# Patient Record
Sex: MALE | Race: WHITE | NOT HISPANIC OR LATINO
[De-identification: names, ages, dates, MRNs, and addresses within clinical notes are randomized per-mention and may not be internally consistent; named-entity substitution may affect disease eponyms.]

---

## 2017-06-16 ENCOUNTER — APPOINTMENT (OUTPATIENT)
Dept: CT IMAGING | Facility: IMAGING CENTER | Age: 72
End: 2017-06-16

## 2017-06-16 ENCOUNTER — OUTPATIENT (OUTPATIENT)
Dept: OUTPATIENT SERVICES | Facility: HOSPITAL | Age: 72
LOS: 1 days | End: 2017-06-16
Payer: MEDICARE

## 2017-06-16 DIAGNOSIS — C34.90 MALIGNANT NEOPLASM OF UNSPECIFIED PART OF UNSPECIFIED BRONCHUS OR LUNG: ICD-10-CM

## 2017-06-16 PROCEDURE — 71250 CT THORAX DX C-: CPT

## 2017-06-20 ENCOUNTER — APPOINTMENT (OUTPATIENT)
Dept: THORACIC SURGERY | Facility: CLINIC | Age: 72
End: 2017-06-20

## 2017-06-20 VITALS
HEART RATE: 78 BPM | DIASTOLIC BLOOD PRESSURE: 81 MMHG | HEIGHT: 67 IN | WEIGHT: 158 LBS | SYSTOLIC BLOOD PRESSURE: 143 MMHG | BODY MASS INDEX: 24.8 KG/M2 | OXYGEN SATURATION: 98 %

## 2017-10-10 ENCOUNTER — APPOINTMENT (OUTPATIENT)
Dept: CT IMAGING | Facility: IMAGING CENTER | Age: 72
End: 2017-10-10
Payer: MEDICARE

## 2017-10-10 ENCOUNTER — OUTPATIENT (OUTPATIENT)
Dept: OUTPATIENT SERVICES | Facility: HOSPITAL | Age: 72
LOS: 1 days | End: 2017-10-10
Payer: MEDICARE

## 2017-10-10 DIAGNOSIS — C34.90 MALIGNANT NEOPLASM OF UNSPECIFIED PART OF UNSPECIFIED BRONCHUS OR LUNG: ICD-10-CM

## 2017-10-10 PROCEDURE — 71250 CT THORAX DX C-: CPT | Mod: 26

## 2017-10-10 PROCEDURE — 71250 CT THORAX DX C-: CPT

## 2017-12-19 ENCOUNTER — APPOINTMENT (OUTPATIENT)
Dept: THORACIC SURGERY | Facility: CLINIC | Age: 72
End: 2017-12-19
Payer: MEDICARE

## 2017-12-19 VITALS
OXYGEN SATURATION: 98 % | HEART RATE: 69 BPM | HEIGHT: 67 IN | DIASTOLIC BLOOD PRESSURE: 75 MMHG | WEIGHT: 158 LBS | RESPIRATION RATE: 14 BRPM | SYSTOLIC BLOOD PRESSURE: 150 MMHG | BODY MASS INDEX: 24.8 KG/M2

## 2017-12-19 PROCEDURE — 99215 OFFICE O/P EST HI 40 MIN: CPT

## 2018-05-22 ENCOUNTER — APPOINTMENT (OUTPATIENT)
Dept: THORACIC SURGERY | Facility: CLINIC | Age: 73
End: 2018-05-22
Payer: MEDICARE

## 2018-05-22 VITALS
HEART RATE: 80 BPM | SYSTOLIC BLOOD PRESSURE: 147 MMHG | BODY MASS INDEX: 24.33 KG/M2 | WEIGHT: 155 LBS | OXYGEN SATURATION: 98 % | HEIGHT: 67 IN | RESPIRATION RATE: 18 BRPM | DIASTOLIC BLOOD PRESSURE: 74 MMHG

## 2018-05-22 DIAGNOSIS — C34.90 MALIGNANT NEOPLASM OF UNSPECIFIED PART OF UNSPECIFIED BRONCHUS OR LUNG: ICD-10-CM

## 2018-05-22 PROCEDURE — 99213 OFFICE O/P EST LOW 20 MIN: CPT

## 2018-11-27 ENCOUNTER — APPOINTMENT (OUTPATIENT)
Dept: THORACIC SURGERY | Facility: CLINIC | Age: 73
End: 2018-11-27
Payer: MEDICARE

## 2018-11-27 VITALS
TEMPERATURE: 98 F | HEART RATE: 73 BPM | OXYGEN SATURATION: 96 % | BODY MASS INDEX: 25.39 KG/M2 | WEIGHT: 158 LBS | HEIGHT: 66 IN | RESPIRATION RATE: 18 BRPM | DIASTOLIC BLOOD PRESSURE: 90 MMHG | SYSTOLIC BLOOD PRESSURE: 148 MMHG

## 2018-11-27 PROCEDURE — 99213 OFFICE O/P EST LOW 20 MIN: CPT

## 2019-05-28 ENCOUNTER — APPOINTMENT (OUTPATIENT)
Dept: THORACIC SURGERY | Facility: CLINIC | Age: 74
End: 2019-05-28
Payer: MEDICARE

## 2019-05-28 VITALS
WEIGHT: 158 LBS | HEIGHT: 66 IN | RESPIRATION RATE: 17 BRPM | OXYGEN SATURATION: 98 % | BODY MASS INDEX: 25.39 KG/M2 | HEART RATE: 64 BPM | DIASTOLIC BLOOD PRESSURE: 78 MMHG | SYSTOLIC BLOOD PRESSURE: 150 MMHG | TEMPERATURE: 97.8 F

## 2019-05-28 PROCEDURE — 99214 OFFICE O/P EST MOD 30 MIN: CPT

## 2019-05-29 NOTE — PHYSICAL EXAM
[Sclera] : the sclera and conjunctiva were normal [Neck Appearance] : the appearance of the neck was normal [Neck Cervical Mass (___cm)] : no neck mass was observed [] : the neck was supple [Auscultation Breath Sounds / Voice Sounds] : lungs were clear to auscultation bilaterally [Respiration, Rhythm And Depth] : normal respiratory rhythm and effort [Heart Sounds] : normal S1 and S2 [Heart Rate And Rhythm] : heart rate was normal and rhythm regular [Abdomen Soft] : soft [Examination Of The Chest] : the chest was normal in appearance [Abdomen Tenderness] : non-tender [Cervical Lymph Nodes Enlarged Posterior Bilaterally] : posterior cervical [Cervical Lymph Nodes Enlarged Anterior Bilaterally] : anterior cervical [Supraclavicular Lymph Nodes Enlarged Bilaterally] : supraclavicular [No CVA Tenderness] : no ~M costovertebral angle tenderness [Abnormal Walk] : normal gait [Skin Color & Pigmentation] : normal skin color and pigmentation [Oriented To Time, Place, And Person] : oriented to person, place, and time [No Focal Deficits] : no focal deficits [Impaired Insight] : insight and judgment were intact [Affect] : the affect was normal

## 2019-05-31 NOTE — ASSESSMENT
[FreeTextEntry1] : 72 y/o male returns for follow up S/P Right VATS, RULobectomy on 4/25/12 for T3 N0 moderately differentiated adenocarcinoma for two separate tumors that measured 2.0 x 1.5 cm and 0.9 x 0.6 cm. 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 The April tumor was compared to the May tumor, and one of those tumors is histomorphologically identical. Thus M1a (separate tumor nodule in the contralateral lobe) was designated. No mutation identified and not treated with adjuvant chemotherapy. \par \par Chest CT Scan 5/17/19 revealed:\par - a nonspecific groundglass density noted in the midportion the right lung, series 4 image 36, measures 6 mm in greatest dimension, unchanged\par - postsurgical reaction noted in the right paramediastinal contour in the region of the right tracheobronchial tree and in the left subhilar region\par - no interval change since prior CT Scan\par - no evidence of recurrent neoplasm\par \par I have reviewed the patient's medical records and diagnostic images during the time of this office visit, and I have made the following recommendation: \par 1.  Follow up in 6 months with CXR.\par \par Written by Debora Babb NP, acting as a scribe for AWA VALENTE MD.\par \par The documentation recorded by the scribe accurately reflects the service I personally performed and the decisions made by me. AWA VALENTE MD\par

## 2019-05-31 NOTE — CONSULT LETTER
[Dear  ___] : Dear  [unfilled], [Courtesy Letter:] : I had the pleasure of seeing your patient, [unfilled], in my office today. [Please see my note below.] : Please see my note below. [Sincerely,] : Sincerely, [FreeTextEntry2] : Dr. Radha Lam,  [FreeTextEntry3] : \par \par \par Ac Ramos MD, FACS \par Chief, Division of Thoracic Surgery \par Director, Minimally Invasive Thoracic Surgery \par Department of Cardiovascular and Thoracic Surgery \par [facility] \par , Cardiovascular and Thoracic Surgery

## 2019-05-31 NOTE — HISTORY OF PRESENT ILLNESS
[FreeTextEntry1] : 72 y/o male returns for follow up S/P Right VATS, RULobectomy on 4/25/12 for T3 N0 moderately differentiated adenocarcinoma for two separate tumors that measured 2.0 x 1.5 cm and 0.9 x 0.6 cm. 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 The April tumor was compared to the May tumor, and one of those tumors is histomorphologically identical. Thus M1a (separate tumor nodule in the contralateral lobe) was designated. No mutation identified and not treated with adjuvant chemotherapy. \par \par Chest CT Scan 5/17/19 revealed:\par - a nonspecific groundglass density noted in the midportion the right lung, series 4 image 36, measures 6 mm in greatest dimension, unchanged\par - postsurgical reaction noted in the right paramediastinal contour in the region of the right tracheobronchial tree and in the left subhilar region\par - no interval change since prior CT Scan\par - no evidence of recurrent neoplasm\par \par He reports that he feels well and he denies any fatigue, fever, chills, cough, shortness of breath, chest pain, hemoptysis, or recent illness.

## 2019-11-26 ENCOUNTER — APPOINTMENT (OUTPATIENT)
Dept: THORACIC SURGERY | Facility: CLINIC | Age: 74
End: 2019-11-26

## 2020-01-07 ENCOUNTER — APPOINTMENT (OUTPATIENT)
Dept: THORACIC SURGERY | Facility: CLINIC | Age: 75
End: 2020-01-07
Payer: MEDICARE

## 2020-01-07 VITALS
SYSTOLIC BLOOD PRESSURE: 151 MMHG | WEIGHT: 158 LBS | DIASTOLIC BLOOD PRESSURE: 83 MMHG | TEMPERATURE: 98.1 F | RESPIRATION RATE: 16 BRPM | BODY MASS INDEX: 25.39 KG/M2 | HEART RATE: 82 BPM | HEIGHT: 66 IN | OXYGEN SATURATION: 95 %

## 2020-01-07 PROCEDURE — 99214 OFFICE O/P EST MOD 30 MIN: CPT

## 2020-01-07 NOTE — PHYSICAL EXAM
[Sclera] : the sclera and conjunctiva were normal [Neck Appearance] : the appearance of the neck was normal [] : the neck was supple [Neck Cervical Mass (___cm)] : no neck mass was observed [Respiration, Rhythm And Depth] : normal respiratory rhythm and effort [Apical Impulse] : the apical impulse was normal [Auscultation Breath Sounds / Voice Sounds] : lungs were clear to auscultation bilaterally [Examination Of The Chest] : the chest was normal in appearance [Abdomen Soft] : soft [Abdomen Tenderness] : non-tender [Cervical Lymph Nodes Enlarged Posterior Bilaterally] : posterior cervical [Cervical Lymph Nodes Enlarged Anterior Bilaterally] : anterior cervical [Supraclavicular Lymph Nodes Enlarged Bilaterally] : supraclavicular [No CVA Tenderness] : no ~M costovertebral angle tenderness [No Focal Deficits] : no focal deficits [Skin Color & Pigmentation] : normal skin color and pigmentation [Abnormal Walk] : normal gait [Impaired Insight] : insight and judgment were intact [Oriented To Time, Place, And Person] : oriented to person, place, and time [Affect] : the affect was normal

## 2020-01-13 NOTE — HISTORY OF PRESENT ILLNESS
[FreeTextEntry1] : 73 y/o male returns for follow up S/P Right VATS, RULobectomy on 4/25/12 for T3 N0 moderately differentiated adenocarcinoma for two separate tumors that measured 2.0 x 1.5 cm and 0.9 x 0.6 cm. 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 The April tumor was compared to the May tumor, and one of those tumors is histomorphologically identical. Thus M1a (separate tumor nodule in the contralateral lobe) was designated. No mutation identified.  He saw oncology for consultation and no adjuvant treatment was advise.  \par \par CXR 11/18/19 No evidence of neoplasm. Lungs are free from infiltrate or effusions. \par \par Chest CT Scan 5/17/19 revealed:\par - a nonspecific groundglass density noted in the midportion the right lung, series 4 image 36, measures 6 mm in greatest dimension, unchanged\par - postsurgical reaction noted in the right paramediastinal contour in the region of the right tracheobronchial tree and in the left subhilar region\par - no interval change since prior CT Scan\par - no evidence of recurrent neoplasm\par \par He returns today and reports that he feels well.  He denies any fever, chills, cough, shortness of breath, chest pain, hemoptysis, or recent illness. \par

## 2020-01-13 NOTE — ASSESSMENT
[FreeTextEntry1] : 75 y/o male returns for follow up S/P Right VATS, RULobectomy on 4/25/12 for T3 N0 moderately differentiated adenocarcinoma for two separate tumors that measured 2.0 x 1.5 cm and 0.9 x 0.6 cm. 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 The April tumor was compared to the May tumor, and one of those tumors is histomorphologically identical. Thus M1a (separate tumor nodule in the contralateral lobe) was designated. No mutation identified.  He saw oncology for consultation and no adjuvant treatment was advise.  \par \par CXR 11/18/19 No evidence of neoplasm. Lungs are free from infiltrate or effusions. \par \par Chest CT Scan 5/17/19 revealed:\par - a nonspecific groundglass density noted in the midportion the right lung, series 4 image 36, measures 6 mm in greatest dimension, unchanged\par - postsurgical reaction noted in the right paramediastinal contour in the region of the right tracheobronchial tree and in the left subhilar region\par - no interval change since prior CT Scan\par - no evidence of recurrent neoplasm\par \par I have reviewed the patient's medical records and diagnostic images during the time of this office visit, and I have made the following recommendation:\par 1.  Follow up in 6 months with noncontrast Chest CT.\par \par I personally performed the services described in the documentation, reviewed the documentation recorded by the scribe in my presence and it accurately and completely records my words and actions.\par \par I, Debora Babb NP, am scribing for and the presence of Dr. Ramos, the following sections HISTORY OF PRESENT ILLNESS, PAST MEDICAL/FAMILY/SOCIAL HISTORY; REVIEW OF SYSTEMS; VITAL SIGNS; PHYSICAL EXAM; DISPOSITION.\par

## 2020-01-13 NOTE — CONSULT LETTER
[Dear  ___] : Dear  [unfilled], [Courtesy Letter:] : I had the pleasure of seeing your patient, [unfilled], in my office today. [Please see my note below.] : Please see my note below. [Sincerely,] : Sincerely, [FreeTextEntry2] : Radha Lam MD (Onc) \par  [FreeTextEntry3] : Ac Ramos MD, FACS \par Chief, Division of Thoracic Surgery \par Director, Minimally Invasive Thoracic Surgery \par Department of Cardiovascular and Thoracic Surgery \par [facility] \par , Cardiovascular and Thoracic Surgery \par [facility] [employer] [facility] \par

## 2020-06-23 ENCOUNTER — APPOINTMENT (OUTPATIENT)
Dept: THORACIC SURGERY | Facility: CLINIC | Age: 75
End: 2020-06-23

## 2020-08-11 ENCOUNTER — APPOINTMENT (OUTPATIENT)
Dept: THORACIC SURGERY | Facility: CLINIC | Age: 75
End: 2020-08-11
Payer: MEDICARE

## 2020-08-11 VITALS
TEMPERATURE: 97.9 F | SYSTOLIC BLOOD PRESSURE: 164 MMHG | DIASTOLIC BLOOD PRESSURE: 84 MMHG | WEIGHT: 160 LBS | BODY MASS INDEX: 25.71 KG/M2 | OXYGEN SATURATION: 99 % | RESPIRATION RATE: 18 BRPM | HEIGHT: 66 IN | HEART RATE: 71 BPM

## 2020-08-11 PROCEDURE — 99214 OFFICE O/P EST MOD 30 MIN: CPT

## 2020-08-11 NOTE — CONSULT LETTER
[Courtesy Letter:] : I had the pleasure of seeing your patient, [unfilled], in my office today. [Please see my note below.] : Please see my note below. [Consult Closing:] : Thank you very much for allowing me to participate in the care of this patient.  If you have any questions, please do not hesitate to contact me. [Sincerely,] : Sincerely, [FreeTextEntry2] : Radha Lam MD (Onc)  [FreeTextEntry3] : \par \par \par Ac Ramos MD, FACS \par Chief, Division of Thoracic Surgery \par Director, Minimally Invasive Thoracic Surgery \par Department of Cardiovascular and Thoracic Surgery \par [facility] \par , Cardiovascular and Thoracic Surgery

## 2020-08-11 NOTE — PHYSICAL EXAM
[Neck Appearance] : the appearance of the neck was normal [Sclera] : the sclera and conjunctiva were normal [PERRL With Normal Accommodation] : pupils were equal in size, round, and reactive to light [Auscultation Breath Sounds / Voice Sounds] : lungs were clear to auscultation bilaterally [] : no respiratory distress [Respiration, Rhythm And Depth] : normal respiratory rhythm and effort [Murmurs] : no murmurs [Heart Sounds] : normal S1 and S2 [Abdomen Soft] : soft [Abdomen Tenderness] : non-tender [Examination Of The Chest] : the chest was normal in appearance [Cervical Lymph Nodes Enlarged Anterior Bilaterally] : anterior cervical [Supraclavicular Lymph Nodes Enlarged Bilaterally] : supraclavicular [Abnormal Walk] : normal gait [Cervical Lymph Nodes Enlarged Posterior Bilaterally] : posterior cervical [Skin Turgor] : normal skin turgor [No Focal Deficits] : no focal deficits [Skin Color & Pigmentation] : normal skin color and pigmentation [Affect] : the affect was normal [Oriented To Time, Place, And Person] : oriented to person, place, and time [Mood] : the mood was normal

## 2020-08-11 NOTE — HISTORY OF PRESENT ILLNESS
[FreeTextEntry1] : Mr. Benson is a 75 y/o male who presents today for follow up.\par \par He is s/p Right VATS, RULobectomy on 4/25/12 for T3 N0 moderately differentiated adenocarcinoma for two separate tumors that measured 2.0 x 1.5 cm and 0.9 x 0.6 cm.  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  The April tumor was compared to the May tumor, and one of those tumors is histomorphologically identical. Thus M1a (separate tumor nodule in the contralateral lobe) was designated. No mutation identified. He saw oncology for consultation and no adjuvant treatment was advised. \par \par CT Chest on 5/17/19 revealed:\par - Nonspecific groundglass density noted in the midportion the right lung, 6 mm, unchanged\par - Postsurgical reaction noted in the right paramediastinal contour in the region of the right tracheobronchial tree and in the left subhilar region\par \par CXR on 11/18/19 revealed:\par - No evidence of neoplasm, lungs are free from infiltrate or effusions. \par \par CT Chest on 7/16/2020 reveals:\par - SHORTY subpleural gg nodule appears increased compared to 2018 + 2019, now measuring 17 x 18 x 2 0mm (previously 17 x 7 x 9 mm) \par - Right upper lung 6 mm gg attenuation nodule appears similar. \par - Ill-defined left apical gg attenuation nodule measuring 10 mm overall similar. \par - Right midlung field 6 mm nodule characterized by central lucency and slightly dense border not appreciably changed.\par - Right mid lower lung field gg attenuation nodule measuring 14 mm which appears to be increasing in size. 2017 was 9mm; 2018 9mm; 2019 12mm\par \par He had PET/CT ordered by oncology, Dr. Lam.\par \par PET/CT On 7/30/2020 reveals:\par - No evidence of increased FDG uptake in either lung to indicate neoplastic etiology\par - Groundglass density described in recent CT Chest at peripheral aspect of left lung is not FDG avid\par - Additional described densities in right + left lung do not accumulate isotope\par \par He denies any fever, chills, cough, shortness of breath, chest pain, hemoptysis, or recent illness.\par

## 2020-08-11 NOTE — ASSESSMENT
[FreeTextEntry1] : 75 y/o male, follow up.  He is s/p Right VATS, RULobectomy on 4/25/12 for T3 N0 moderately differentiated adenocarcinoma for two separate tumors that measured 2.0 x 1.5 cm and 0.9 x 0.6 cm.  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  The April tumor was compared to the May tumor, and one of those tumors is histomorphologically identical. Thus M1a (separate tumor nodule in the contralateral lobe) was designated. No mutation identified. He saw oncology for consultation and no adjuvant treatment was advised. \par \par CT Chest on 7/16/2020 reveals:\par - SHORTY subpleural gg nodule appears increased compared to 2018 + 2019, now measuring 17 x 18 x 2 0mm (previously 17 x 7 x 9 mm) \par - Right upper lung 6 mm gg attenuation nodule appears similar. \par - Ill-defined left apical gg attenuation nodule measuring 10 mm overall similar. \par - Right midlung field 6 mm nodule characterized by central lucency and slightly dense border not appreciably changed.\par - Right mid lower lung field gg attenuation nodule measuring 14 mm which appears to be increasing in size. 2017 was 9mm; 2018 9mm; 2019 12mm\par \par He had PET/CT ordered by oncology, Dr. Lam.\par \par PET/CT On 7/30/2020 reveals:\par - No evidence of increased FDG uptake in either lung to indicate neoplastic etiology\par - Groundglass density described in recent CT Chest at peripheral aspect of left lung is not FDG avid\par - Additional described densities in right + left lung do not accumulate isotope\par \par I have reviewed the patient's medical records and diagnostic images during the time of this office visit, and I have made the following recommendation:\par 1.  Return to office for follow up with CT Chest in 6 months\par \par \par I personally performed the services described in the documentation, reviewed the documentation recorded by the scribe in my presence and it accurately and completely records my words and actions.\par \par I, Julianna Martin, am scribing for and the presence of ALF Hidalgo the following sections HISTORY OF PRESENT ILLNESSES, PAST MEDICAL/FAMILY/SOCIAL HISTORY; REVIEW OF SYSTEMS; VITAL SIGNS; PHYSICAL EXAM; DISPOSITION.\par

## 2021-03-09 ENCOUNTER — APPOINTMENT (OUTPATIENT)
Dept: THORACIC SURGERY | Facility: CLINIC | Age: 76
End: 2021-03-09
Payer: MEDICARE

## 2021-03-09 VITALS
SYSTOLIC BLOOD PRESSURE: 167 MMHG | DIASTOLIC BLOOD PRESSURE: 95 MMHG | RESPIRATION RATE: 18 BRPM | OXYGEN SATURATION: 98 % | WEIGHT: 156 LBS | HEIGHT: 66 IN | TEMPERATURE: 98 F | BODY MASS INDEX: 25.07 KG/M2 | HEART RATE: 80 BPM

## 2021-03-09 PROCEDURE — 99072 ADDL SUPL MATRL&STAF TM PHE: CPT

## 2021-03-09 PROCEDURE — 99214 OFFICE O/P EST MOD 30 MIN: CPT

## 2021-03-09 NOTE — HISTORY OF PRESENT ILLNESS
[FreeTextEntry1] : Mr. Benson is a 76 y/o male who presents today for follow up.\par \par He is s/p Right VATS, RULobectomy on 4/25/12 for T3 N0 moderately differentiated adenocarcinoma for two separate tumors that measured 2.0 x 1.5 cm and 0.9 x 0.6 cm. 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 The April tumor was compared to the May tumor, and one of those tumors is histomorphologically identical. Thus M1a (separate tumor nodule in the contralateral lobe) was designated. No mutation identified. He saw oncology for consultation and no adjuvant treatment was advised. \par \par CT Chest on 5/17/19 revealed:\par - Nonspecific groundglass density noted in the midportion the right lung, 6 mm, unchanged\par - Postsurgical reaction noted in the right paramediastinal contour in the region of the right tracheobronchial tree and in the left subhilar region\par \par CXR on 11/18/19 revealed:\par - No evidence of neoplasm, lungs are free from infiltrate or effusions. \par \par CT Chest on 7/16/2020 reveals:\par - SHORTY subpleural gg nodule appears increased compared to 2018 + 2019, now measuring 17 x 18 x 2 0mm (previously 17 x 7 x 9 mm) \par - Right upper lung 6 mm gg attenuation nodule appears similar. \par - Ill-defined left apical gg attenuation nodule measuring 10 mm overall similar. \par - Right midlung field 6 mm nodule characterized by central lucency and slightly dense border not appreciably changed.\par - Right mid lower lung field gg attenuation nodule measuring 14 mm which appears to be increasing in size. 2017 was 9mm; 2018 9mm; 2019 12mm\par \par PET/CT On 7/30/2020 reveals:\par - No evidence of increased FDG uptake in either lung to indicate neoplastic etiology\par - Groundglass density described in recent CT Chest at peripheral aspect of left lung is not FDG avid\par - Additional described densities in right + left lung do not accumulate isotope\par \par CT chest on 11/19/2020:\par - COPD\par - pleural parenchymal scarring at the left lung apex is unchanged\par - a subpleural groundglass density at the peripheral aspect of the left mid lung is unchanged in size and appearance, again measuring 2.2 cm in craniocaudal dimension by 8.5 mm un width. No new pulmonary nodules are noted. \par - prominence of the ascending aorta to 3.9 cm.\par \par Patient is here today for a follow up. Patient denies shortness of breath, cough, chest pain, fever, chills.

## 2021-03-09 NOTE — ASSESSMENT
[FreeTextEntry1] : Mr. Benson is a 76 y/o male who presents today for follow up.\par \par He is s/p Right VATS, RULobectomy on 4/25/12 for T3 N0 moderately differentiated adenocarcinoma for two separate tumors that measured 2.0 x 1.5 cm and 0.9 x 0.6 cm. 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 The April tumor was compared to the May tumor, and one of those tumors is histomorphologically identical. Thus M1a (separate tumor nodule in the contralateral lobe) was designated. No mutation identified. He saw oncology for consultation and no adjuvant treatment was advised. \par \par CT chest on 11/19/2020:\par - COPD\par - pleural parenchymal scarring at the left lung apex is unchanged\par - a subpleural groundglass density at the peripheral aspect of the left mid lung is unchanged in size and appearance, again measuring 2.2 cm in craniocaudal dimension by 8.5 mm un width. No new pulmonary nodules are noted. \par - prominence of the ascending aorta to 3.9 cm.\par \par I have reviewed the patient's medical records and diagnostic images at time of this office consultation and have made the following recommendation:\par 1. CT chest reviewed and explained to patient, I recommended patient to return to office in May, 2021 with CT Chest without contrast.\par 2. F/u with Dr. Lam\par \par \par I personally performed the services described in the documentation, reviewed the documentation recorded by the scribe in my presence and it accurately and completely records my words and actions.\par \par I, Karli Cheek, NP, am scribing for and the presence of ALF Hidalgo, the following sections HISTORY OF PRESENT ILLNESS, PAST MEDICAL/FAMILY/SOCIAL HISTORY; REVIEW OF SYSTEMS; VITAL SIGNS; PHYSICAL EXAM; DISPOSITION. \par

## 2021-03-09 NOTE — DATA REVIEWED
[FreeTextEntry1] : CT chest on 11/19/2020:\par - COPD\par - pleural parenchymal scarring at the left lung apex is unchanged\par - a subpleural groundglass density at the peripheral aspect of the left mid lung is unchanged in size and appearance, again measuring 2.2 cm in craniocaudal dimension by 8.5 mm un width. No new pulmonary nodules are noted. \par - prominence of the ascending aorta to 3.9 cm.

## 2021-03-09 NOTE — CONSULT LETTER
[Dear  ___] : Dear  [unfilled], [Consult Letter:] : I had the pleasure of evaluating your patient, [unfilled]. [( Thank you for referring [unfilled] for consultation for _____ )] : Thank you for referring [unfilled] for consultation for [unfilled] [Please see my note below.] : Please see my note below. [Consult Closing:] : Thank you very much for allowing me to participate in the care of this patient.  If you have any questions, please do not hesitate to contact me. [Sincerely,] : Sincerely, [FreeTextEntry2] : Radha Lam MD (Onc)  [FreeTextEntry3] : Ac Ramos MD, FACS \par Chief, Division of Thoracic Surgery \par Director, Minimally Invasive Thoracic Surgery \par Department of Cardiovascular and Thoracic Surgery \par [facility] \par , Cardiovascular and Thoracic Surgery\par \par

## 2021-05-04 ENCOUNTER — APPOINTMENT (OUTPATIENT)
Dept: THORACIC SURGERY | Facility: CLINIC | Age: 76
End: 2021-05-04
Payer: MEDICARE

## 2021-05-04 ENCOUNTER — NON-APPOINTMENT (OUTPATIENT)
Age: 76
End: 2021-05-04

## 2021-05-04 VITALS
SYSTOLIC BLOOD PRESSURE: 166 MMHG | WEIGHT: 159 LBS | TEMPERATURE: 98.2 F | DIASTOLIC BLOOD PRESSURE: 84 MMHG | HEART RATE: 73 BPM | BODY MASS INDEX: 25.55 KG/M2 | OXYGEN SATURATION: 99 % | HEIGHT: 66 IN

## 2021-05-04 DIAGNOSIS — Z01.818 ENCOUNTER FOR OTHER PREPROCEDURAL EXAMINATION: ICD-10-CM

## 2021-05-04 PROCEDURE — 99072 ADDL SUPL MATRL&STAF TM PHE: CPT

## 2021-05-04 PROCEDURE — 99214 OFFICE O/P EST MOD 30 MIN: CPT

## 2021-05-04 NOTE — CONSULT LETTER
[Dear  ___] : Dear  [unfilled], [Courtesy Letter:] : I had the pleasure of seeing your patient, [unfilled], in my office today. [Please see my note below.] : Please see my note below. [Sincerely,] : Sincerely, [FreeTextEntry2] : Radha Lam MD (Onc)  [FreeTextEntry3] : Ac Ramos MD, FACS \par Chief, Division of Thoracic Surgery \par Director, Minimally Invasive Thoracic Surgery \par Department of Cardiovascular and Thoracic Surgery \par [facility] \par , Cardiovascular and Thoracic Surgery\par \par \par

## 2021-05-04 NOTE — HISTORY OF PRESENT ILLNESS
[FreeTextEntry1] : Mr. Benson is a 74 y/o male who presents today for follow up.\par \par He is s/p Right VATS, RULobectomy on 4/25/12 for T3 N0 moderately differentiated adenocarcinoma for two separate tumors that measured 2.0 x 1.5 cm and 0.9 x 0.6 cm. 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 The April tumor was compared to the May tumor, and one of those tumors is histomorphologically identical. Thus M1a (separate tumor nodule in the contralateral lobe) was designated. No mutation identified. He saw oncology for consultation and no adjuvant treatment was advised. \par \par CT Chest on 7/16/2020 reveals:\par - SHORTY subpleural gg nodule appears increased compared to 2018 + 2019, now measuring 17 x 18 x 2 0mm (previously 17 x 7 x 9 mm) \par - Right upper lung 6 mm gg attenuation nodule appears similar. \par - Ill-defined left apical gg attenuation nodule measuring 10 mm overall similar. \par - Right midlung field 6 mm nodule characterized by central lucency and slightly dense border not appreciably changed.\par - Right mid lower lung field gg attenuation nodule measuring 14 mm which appears to be increasing in size. 2017 was 9mm; 2018 9mm; 2019 12mm\par \par PET/CT On 7/30/2020 reveals:\par - No evidence of increased FDG uptake in either lung to indicate neoplastic etiology\par - Groundglass density described in recent CT Chest at peripheral aspect of left lung is not FDG avid\par - Additional described densities in right + left lung do not accumulate isotope\par \par CT chest on 11/19/2020:\par - COPD\par - pleural parenchymal scarring at the left lung apex is unchanged\par - a subpleural groundglass density at the peripheral aspect of the left mid lung is unchanged in size and appearance, again measuring 2.2 cm in craniocaudal dimension by 8.5 mm un width. No new pulmonary nodules are noted. \par - prominence of the ascending aorta to 3.9 cm.\par \par CT chest on 4/23/21: \par - Enlarging ground glass density; peripheral aspect of left mid lung (series 4, image 30); previously 1.7 x 8mm now 2.7 x 8 mm. \par - Enlarging ground glass density in right mid lung (series 4, image 37); previously 1.2 cm, now 1.6 cm. \par - 7 mm nodule in RML, lateral segment (series 4, image 44); previously 4 mm .\par - Ascending aorta - 3.8 cm. \par \par Patient presents for follow up. He endorses he is doing well denies shortness of breath, cough, chest pain, fever, chills, unintentional weight loss, and hemoptysis. \par

## 2021-05-04 NOTE — ASSESSMENT
[FreeTextEntry1] : Mr. Benson is a 76 y/o male who presents today for follow up.\par \par He is s/p Right VATS, RULobectomy on 4/25/12 for T3 N0 moderately differentiated adenocarcinoma for two separate tumors that measured 2.0 x 1.5 cm and 0.9 x 0.6 cm. 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 The April tumor was compared to the May tumor, and one of those tumors is histomorphologically identical. Thus M1a (separate tumor nodule in the contralateral lobe) was designated. No mutation identified. He saw oncology for consultation and no adjuvant treatment was advised. \par \par CT chest on 4/23/21: \par - Enlarging ground glass density; peripheral aspect of left mid lung (series 4, image 30); previously 1.7 x 8mm now 2.7 x 8 mm. \par - Enlarging ground glass density in right mid lung (series 4, image 37); previously 1.2 cm, now 1.6 cm. \par - 7 mm nodule in RML, lateral segment (series 4, image 44); previously 4 mm .\par - Ascending aorta - 3.8 cm. \par \par I have independently reviewed the medical records and imaging at the time of this office consultation, and discussed the following interpretations and recommendations with the patient:\par 1. CT chest reviewed and explained to patient and spouse; enlarging gg density in mid left lung and mid right lung. I will reach out to Dr. Lozano to discuss this case further. \par 2. I recommended for patient to undergo PFT and VQ scan for now.\par \par Recommendations reviewed with patient during this office visit, and all questions answered; Patient instructed on the importance of follow up and verbalizes understanding.\par \par I personally performed the services described in the documentation, reviewed the documentation recorded by the scribe in my presence and it accurately and completely records my words and actions.\par \par I, Isabel King NP, am scribing for and in the presence of [facility], the following sections HISTORY OF PRESENT ILLNESS, PAST MEDICAL/FAMILY/SOCIAL HISTORY; REVIEW OF SYSTEMS; VITAL SIGNS; PHYSICAL EXAM; DISPOSITION.\par  \par

## 2021-05-04 NOTE — PHYSICAL EXAM
[] : no respiratory distress [Respiration, Rhythm And Depth] : normal respiratory rhythm and effort [Auscultation Breath Sounds / Voice Sounds] : lungs were clear to auscultation bilaterally [Heart Sounds] : normal S1 and S2 [Examination Of The Chest] : the chest was normal in appearance [Cervical Lymph Nodes Enlarged Posterior Bilaterally] : posterior cervical [Cervical Lymph Nodes Enlarged Anterior Bilaterally] : anterior cervical [Supraclavicular Lymph Nodes Enlarged Bilaterally] : supraclavicular [Abnormal Walk] : normal gait

## 2021-05-14 ENCOUNTER — APPOINTMENT (OUTPATIENT)
Dept: NUCLEAR MEDICINE | Facility: HOSPITAL | Age: 76
End: 2021-05-14
Payer: MEDICARE

## 2021-05-14 ENCOUNTER — OUTPATIENT (OUTPATIENT)
Dept: OUTPATIENT SERVICES | Facility: HOSPITAL | Age: 76
LOS: 1 days | End: 2021-05-14

## 2021-05-14 DIAGNOSIS — Z01.818 ENCOUNTER FOR OTHER PREPROCEDURAL EXAMINATION: ICD-10-CM

## 2021-05-14 PROCEDURE — 78597 LUNG PERFUSION DIFFERENTIAL: CPT | Mod: 26

## 2021-06-01 ENCOUNTER — APPOINTMENT (OUTPATIENT)
Dept: DISASTER EMERGENCY | Facility: CLINIC | Age: 76
End: 2021-06-01

## 2021-06-01 LAB — SARS-COV-2 N GENE NPH QL NAA+PROBE: NOT DETECTED

## 2021-06-04 ENCOUNTER — APPOINTMENT (OUTPATIENT)
Dept: PULMONOLOGY | Facility: CLINIC | Age: 76
End: 2021-06-04
Payer: MEDICARE

## 2021-06-04 PROCEDURE — 94010 BREATHING CAPACITY TEST: CPT

## 2021-06-04 PROCEDURE — 94729 DIFFUSING CAPACITY: CPT

## 2021-06-04 PROCEDURE — 94726 PLETHYSMOGRAPHY LUNG VOLUMES: CPT

## 2021-06-08 ENCOUNTER — APPOINTMENT (OUTPATIENT)
Dept: THORACIC SURGERY | Facility: CLINIC | Age: 76
End: 2021-06-08
Payer: MEDICARE

## 2021-06-08 VITALS
HEIGHT: 66 IN | WEIGHT: 150 LBS | HEART RATE: 75 BPM | TEMPERATURE: 97.8 F | OXYGEN SATURATION: 97 % | BODY MASS INDEX: 24.11 KG/M2 | SYSTOLIC BLOOD PRESSURE: 170 MMHG | DIASTOLIC BLOOD PRESSURE: 82 MMHG

## 2021-06-08 PROCEDURE — 99214 OFFICE O/P EST MOD 30 MIN: CPT

## 2021-06-08 NOTE — HISTORY OF PRESENT ILLNESS
[FreeTextEntry1] : Mr. Benson is a 74 y/o male. \par \par He is s/p Right VATS, RULobectomy on 12 for T3 N0 moderately differentiated adenocarcinoma for two separate tumors that measured 2.0 x 1.5 cm and 0.9 x 0.6 cm. 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 The April tumor was compared to the May tumor, and one of those tumors is histomorphologically identical. Thus M1a (separate tumor nodule in the contralateral lobe) was designated. No mutation identified. He saw oncology for consultation and no adjuvant treatment was advised. \par \par CT Chest on 2020 reveals:\par - SHORTY subpleural gg nodule appears increased compared to  + , now measuring 17 x 18 x 2 0mm (previously 17 x 7 x 9 mm) \par - Right upper lung 6 mm gg attenuation nodule appears similar. \par - Ill-defined left apical gg attenuation nodule measuring 10 mm overall similar. \par - Right midlung field 6 mm nodule characterized by central lucency and slightly dense border not appreciably changed.\par - Right mid lower lung field gg attenuation nodule measuring 14 mm which appears to be increasing in size. 2017 was 9mm; 2018 9mm; 2019 12mm\par \par PET/CT On 2020 reveals:\par - No evidence of increased FDG uptake in either lung to indicate neoplastic etiology\par - Groundglass density described in recent CT Chest at peripheral aspect of left lung is not FDG avid\par - Additional described densities in right + left lung do not accumulate isotope\par \par CT chest on 2020:\par - COPD\par - pleural parenchymal scarring at the left lung apex is unchanged\par - a subpleural groundglass density at the peripheral aspect of the left mid lung is unchanged in size and appearance, again measuring 2.2 cm in craniocaudal dimension by 8.5 mm un width. No new pulmonary nodules are noted. \par - prominence of the ascending aorta to 3.9 cm.\par \par CT chest on 21: \par - Enlarging ground glass density; peripheral aspect of left mid lung (series 4, image 30); previously 1.7 x 8mm now 2.7 x 8 mm. \par - Enlarging ground glass density in right mid lung (series 4, image 37); previously 1.2 cm, now 1.6 cm. \par - 7 mm nodule in RML, lateral segment (series 4, image 44); previously 4 mm .\par - Ascending aorta - 3.8 cm. \par \par PFTs on 21: FVC: 5.11 (139%); FEV1: 3.19 (121%); DLCO: 15.3 (65%)\par \par Lung Perfusion scan on 21: \par Right lun%; Left lun%\par Right upper lun% Left upper lung: 10%\par Right mid lun% Left mid lun%\par Right lower lun% Left lower lun%\par \par Patient is here today for a follow up. Patient denies shortness of breath, cough, chest pain, fever, chills.

## 2021-06-08 NOTE — CONSULT LETTER
[Dear  ___] : Dear  [unfilled], [Courtesy Letter:] : I had the pleasure of seeing your patient, [unfilled], in my office today. [Please see my note below.] : Please see my note below. [Sincerely,] : Sincerely, [FreeTextEntry2] : Radha Lam MD (Onc) \par  [FreeTextEntry3] : Ac Ramos MD, FACS \par Chief, Division of Thoracic Surgery \par Director, Minimally Invasive Thoracic Surgery \par Department of Cardiovascular and Thoracic Surgery \par [facility] \par , Cardiovascular and Thoracic Surgery\par \par \par

## 2021-06-08 NOTE — ASSESSMENT
[FreeTextEntry1] : Mr. Benson is a 76 y/o male. \par \par He is s/p Right VATS, RULobectomy on 4/25/12 for T3 N0 moderately differentiated adenocarcinoma for two separate tumors that measured 2.0 x 1.5 cm and 0.9 x 0.6 cm. 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 The April tumor was compared to the May tumor, and one of those tumors is histomorphologically identical. Thus M1a (separate tumor nodule in the contralateral lobe) was designated. No mutation identified. He saw oncology for consultation and no adjuvant treatment was advised. \par \par I have reviewed the patient's medical records and diagnostic images at time of this office consultation and have made the following recommendation:\par 1. PFTs and Lung Perfusion scan reviewed and explained to patient, case discussed with Dr. Schwarz, multiple enlarged ggo, I recommended CT chest w/o contrast in 10/2021. \par \par I personally performed the services described in the documentation, reviewed the documentation recorded by the scribe in my presence and it accurately and completely records my words and actions.\par \par I, Karli Cheek NP, am scribing for and the presence of [facility], the following sections HISTORY OF PRESENT ILLNESS, PAST MEDICAL/FAMILY/SOCIAL HISTORY; REVIEW OF SYSTEMS; VITAL SIGNS; PHYSICAL EXAM; DISPOSITION. \par \par

## 2021-06-08 NOTE — DATA REVIEWED
[FreeTextEntry1] : Independently reviewed the following imaging:\par - PFTs on 6/4/21\par - Lung Perfusion scan on 5/14/21

## 2021-10-19 ENCOUNTER — APPOINTMENT (OUTPATIENT)
Dept: THORACIC SURGERY | Facility: CLINIC | Age: 76
End: 2021-10-19

## 2022-04-09 ENCOUNTER — NON-APPOINTMENT (OUTPATIENT)
Age: 77
End: 2022-04-09

## 2022-04-11 ENCOUNTER — APPOINTMENT (OUTPATIENT)
Dept: UROLOGY | Facility: CLINIC | Age: 77
End: 2022-04-11
Payer: MEDICARE

## 2022-04-11 VITALS
DIASTOLIC BLOOD PRESSURE: 80 MMHG | WEIGHT: 145 LBS | RESPIRATION RATE: 17 BRPM | BODY MASS INDEX: 22.76 KG/M2 | OXYGEN SATURATION: 96 % | HEART RATE: 76 BPM | TEMPERATURE: 97.7 F | SYSTOLIC BLOOD PRESSURE: 181 MMHG | HEIGHT: 67 IN

## 2022-04-11 PROCEDURE — 99204 OFFICE O/P NEW MOD 45 MIN: CPT

## 2022-04-16 ENCOUNTER — TRANSCRIPTION ENCOUNTER (OUTPATIENT)
Age: 77
End: 2022-04-16

## 2022-04-19 LAB
ALP BLD-CCNC: 73 U/L
ANION GAP SERPL CALC-SCNC: 13 MMOL/L
APPEARANCE: CLEAR
BACTERIA UR CULT: NORMAL
BACTERIA: NEGATIVE
BILIRUBIN URINE: NEGATIVE
BLOOD URINE: NEGATIVE
BUN SERPL-MCNC: 14 MG/DL
CALCIUM SERPL-MCNC: 9.4 MG/DL
CHLORIDE SERPL-SCNC: 103 MMOL/L
CO2 SERPL-SCNC: 24 MMOL/L
COLOR: NORMAL
CREAT SERPL-MCNC: 0.85 MG/DL
EGFR: 90 ML/MIN/1.73M2
GLUCOSE QUALITATIVE U: NEGATIVE
GLUCOSE SERPL-MCNC: 86 MG/DL
HYALINE CASTS: 0 /LPF
KETONES URINE: NEGATIVE
LEUKOCYTE ESTERASE URINE: NEGATIVE
MICROSCOPIC-UA: NORMAL
NITRITE URINE: NEGATIVE
PH URINE: 6.5
POTASSIUM SERPL-SCNC: 4.4 MMOL/L
PROTEIN URINE: NEGATIVE
RED BLOOD CELLS URINE: 1 /HPF
SODIUM SERPL-SCNC: 140 MMOL/L
SPECIFIC GRAVITY URINE: 1.02
SQUAMOUS EPITHELIAL CELLS: 1 /HPF
URINE CYTOLOGY: NORMAL
UROBILINOGEN URINE: NORMAL
WHITE BLOOD CELLS URINE: 1 /HPF

## 2022-04-19 NOTE — HISTORY OF PRESENT ILLNESS
[FreeTextEntry1] : 04/11/2022: Mr. Benson is a 77y/o M presenting today for evaluation of BPH with urinary obstruction symptoms. Accompanied by wife. Patient states 1 month ago, developed difficulty to urinate despite the urge and bladder pressure. Notes he was using treadmill which he does not often use. Symptoms have resolved since then. However, during a similar time, developed shoulder and neck pains and was started on prednisone 10mg which he has been on for 2 days and feeling better. Had lab work at [facility] with PCP Dr. Sen on 11/2/21 showing ESR of 88 with upper limits of 19, PSA 3.45, HbA1c 5.4, creatinine 0.96, alk phos normal 63, eGFR 76. No FHx of prostate cancer. No hx of kidney stones. Erections are normal. Hx of lung cancer. Former smoker, almost 2 packs a day for 40 years.

## 2022-04-19 NOTE — ADDENDUM
[FreeTextEntry1] : I, Mellissa Johnsin, acted solely as a scribe for Dr. Fernando Khan on this date 04/11/2022.\par \par All medical record entries made by the Scribe were at my, Dr. Fernando Khan, direction and personally dictated by me on 04/11/2022. I have reviewed the chart and agree that the record accurately reflects my personal performance of the history, physical exam, assessment and plan.  I have also personally directed, reviewed and agreed with the chart.

## 2022-04-19 NOTE — ASSESSMENT
[FreeTextEntry1] : 04/11/2022: Mr. Benson is a 75y/o M presenting today for evaluation of BPH with urinary obstruction symptoms. Accompanied by wife. Patient states 1 month ago, developed difficulty to urinate despite the urge and bladder pressure. Notes he was using treadmill which he does not often use. Symptoms have resolved since then. However, during a similar time, developed shoulder and neck pains and was started on prednisone 10mg which he has been on for 2 days and feeling better. Had lab work at [facility] with PCP Dr. Sen on 11/2/21 showing ESR of 88 with upper limits of 19, PSA 3.45, HbA1c 5.4, creatinine 0.96, alk phos normal 63, eGFR 76. No FHx of prostate cancer. No hx of kidney stones. Erections are normal. Hx of lung cancer. Former smoker, almost 2 packs a day for 40 years. \par \par Digital rectal exam found no suspicious rectal masses. No rectal mucosal lesions. Anal tone is normal. The prostate is non tender, with normal texture, discrete borders, and no nodules. Globular prostate. It is a 35 gram transurethral resection size. No gross blood on examining fingers. \par \par Patient is feeling well and does not need treatment at the current time. \par \par Blood work today included BMP, alkaline phosphatase, PSA, and testosterone. \par The patient produced a urine sample which will be sent for urinalysis, urine cytology, and urine culture. \par \par RTO in 6 months for follow up or sooner if new urinary symptoms develop. \par \par Preparation, in-person office visit, and coordination of care took: 45 minutes

## 2022-04-19 NOTE — PHYSICAL EXAM
[General Appearance - Well Developed] : well developed [General Appearance - Well Nourished] : well nourished [Normal Appearance] : normal appearance [Well Groomed] : well groomed [General Appearance - In No Acute Distress] : no acute distress [Edema] : no peripheral edema [Respiration, Rhythm And Depth] : normal respiratory rhythm and effort [Exaggerated Use Of Accessory Muscles For Inspiration] : no accessory muscle use [Abdomen Soft] : soft [Abdomen Tenderness] : non-tender [Normal Station and Gait] : the gait and station were normal for the patient's age [] : no rash [No Focal Deficits] : no focal deficits [Oriented To Time, Place, And Person] : oriented to person, place, and time [Affect] : the affect was normal [Mood] : the mood was normal [Not Anxious] : not anxious [FreeTextEntry1] : Digital rectal exam found no suspicious rectal masses. No rectal mucosal lesions. Anal tone is normal. The prostate is non tender, with normal texture, discrete borders, and no nodules. Globular prostate. It is a 35 gram transurethral resection size. No gross blood on examining fingers.

## 2022-04-19 NOTE — LETTER BODY
[Dear  ___] : Dear  [unfilled], [Consult Letter:] : I had the pleasure of evaluating your patient, [unfilled]. [Please see my note below.] : Please see my note below. [Consult Closing:] : Thank you very much for allowing me to participate in the care of this patient.  If you have any questions, please do not hesitate to contact me. [Sincerely,] : Sincerely, [FreeTextEntry2] : Dr. Ervin Sen\par [address]\[place] [address] [FreeTextEntry1] : 04/11/2022: Mr. Benson is a 75y/o M presenting today for evaluation of BPH with urinary obstruction symptoms. Accompanied by wife. Patient states 1 month ago, developed difficulty to urinate despite the urge and bladder pressure. Notes he was using treadmill which he does not often use. Symptoms have resolved since then. However, during a similar time, developed shoulder and neck pains and was started on prednisone 10mg which he has been on for 2 days and feeling better. Had lab work at [facility] with PCP Dr. Sen on 11/2/21 showing ESR of 88 with upper limits of 19, PSA 3.45, HbA1c 5.4, creatinine 0.96, alk phos normal 63, eGFR 76. No FHx of prostate cancer. No hx of kidney stones. Erections are normal. Hx of lung cancer. Former smoker, almost 2 packs a day for 40 years. \par \par Digital rectal exam found no suspicious rectal masses. No rectal mucosal lesions. Anal tone is normal. The prostate is non tender, with normal texture, discrete borders, and no nodules. Globular prostate. It is a 35 gram transurethral resection size. No gross blood on examining fingers. \par \par Patient is feeling well and does not need treatment at the current time. \par \par Blood work today included BMP, alkaline phosphatase, PSA, and testosterone. \par The patient produced a urine sample which will be sent for urinalysis, urine cytology, and urine culture. \par \par RTO in 6 months for follow up or sooner if new urinary symptoms develop.  [FreeTextEntry3] : Fernando Khan MD, PhD

## 2022-04-19 NOTE — LETTER HEADER
[FreeTextEntry3] : Fernando Khan MD, PhD\par [address], [address]\[place] [address]

## 2022-04-20 ENCOUNTER — NON-APPOINTMENT (OUTPATIENT)
Age: 77
End: 2022-04-20

## 2022-04-20 LAB
PSA FREE FLD-MCNC: 19 %
PSA FREE SERPL-MCNC: 0.83 NG/ML
PSA SERPL-MCNC: 4.31 NG/ML
TESTOST FREE SERPL-MCNC: 5.3 PG/ML
TESTOST SERPL-MCNC: 325 NG/DL

## 2022-04-30 ENCOUNTER — RESULT REVIEW (OUTPATIENT)
Age: 77
End: 2022-04-30

## 2022-04-30 ENCOUNTER — APPOINTMENT (OUTPATIENT)
Dept: MRI IMAGING | Facility: IMAGING CENTER | Age: 77
End: 2022-04-30
Payer: MEDICARE

## 2022-04-30 ENCOUNTER — OUTPATIENT (OUTPATIENT)
Dept: OUTPATIENT SERVICES | Facility: HOSPITAL | Age: 77
LOS: 1 days | End: 2022-04-30
Payer: MEDICARE

## 2022-04-30 DIAGNOSIS — R97.20 ELEVATED PROSTATE SPECIFIC ANTIGEN [PSA]: ICD-10-CM

## 2022-04-30 DIAGNOSIS — Z00.8 ENCOUNTER FOR OTHER GENERAL EXAMINATION: ICD-10-CM

## 2022-04-30 PROCEDURE — 76498P: CUSTOM | Mod: 26

## 2022-04-30 PROCEDURE — 72197 MRI PELVIS W/O & W/DYE: CPT | Mod: 26

## 2022-04-30 PROCEDURE — 72197 MRI PELVIS W/O & W/DYE: CPT

## 2022-04-30 PROCEDURE — 76498 UNLISTED MR PROCEDURE: CPT

## 2022-04-30 PROCEDURE — A9585: CPT

## 2022-05-02 ENCOUNTER — APPOINTMENT (OUTPATIENT)
Dept: UROLOGY | Facility: CLINIC | Age: 77
End: 2022-05-02
Payer: MEDICARE

## 2022-05-02 DIAGNOSIS — Z08 ENCOUNTER FOR FOLLOW-UP EXAMINATION AFTER COMPLETED TREATMENT FOR MALIGNANT NEOPLASM: ICD-10-CM

## 2022-05-02 DIAGNOSIS — Z85.118 ENCOUNTER FOR FOLLOW-UP EXAMINATION AFTER COMPLETED TREATMENT FOR MALIGNANT NEOPLASM: ICD-10-CM

## 2022-05-02 DIAGNOSIS — R91.8 OTHER NONSPECIFIC ABNORMAL FINDING OF LUNG FIELD: ICD-10-CM

## 2022-05-02 PROCEDURE — 99443: CPT

## 2022-05-03 ENCOUNTER — APPOINTMENT (OUTPATIENT)
Dept: UROLOGY | Facility: CLINIC | Age: 77
End: 2022-05-03

## 2022-05-13 NOTE — HISTORY OF PRESENT ILLNESS
[FreeTextEntry1] : Telephone visit patient and wife. Patient asks that wife speak on  his behalf. MRI Prostate PIRADS 2. Nocturia every 1-2 hours. some dysuria. No pathogenic bacteria in urine, no pyuria on 4/10/2022 urine sample.

## 2022-05-13 NOTE — ASSESSMENT
[FreeTextEntry1] : Telephone visit patient and wife. Patient asks that wife speak on  his behalf. MRI Prostate PIRADS 2. Nocturia every 1-2 hours. some dysuria. No pathogenic bacteria in urine, no pyuria on 4/10/2022 urine sample. I reviewid Test results. Will Rx sulfamethoxazole/trimethoprim 800/160  & start silodosin 8 mg once daily with food. risks, benefits, alternatives and expectations discussed in detail including nephrotoxicity of SxTx. patient to return to office in 3 weeks,\par \par Telephone visit took 21 minutes.

## 2022-06-02 ENCOUNTER — APPOINTMENT (OUTPATIENT)
Dept: UROLOGY | Facility: CLINIC | Age: 77
End: 2022-06-02
Payer: MEDICARE

## 2022-06-02 VITALS
HEIGHT: 67 IN | WEIGHT: 139.5 LBS | TEMPERATURE: 98 F | RESPIRATION RATE: 16 BRPM | SYSTOLIC BLOOD PRESSURE: 153 MMHG | BODY MASS INDEX: 21.9 KG/M2 | DIASTOLIC BLOOD PRESSURE: 83 MMHG | HEART RATE: 78 BPM

## 2022-06-02 PROCEDURE — 99213 OFFICE O/P EST LOW 20 MIN: CPT

## 2022-06-18 NOTE — HISTORY OF PRESENT ILLNESS
[FreeTextEntry1] : 5/2/2022: Telephone visit patient and wife. Patient asks that wife speak on  his behalf. MRI Prostate PIRADS 2. Nocturia every 1-2 hours. some dysuria. No pathogenic bacteria in urine, no pyuria on 4/10/2022 urine sample.\par \par 06/02/2022: Patient presents today for a follow up. Has been taking silodosin 8 mg once daily but ran out yesterday. Completed a course of Sulfamethoxazole Trimethoprim 800-160 mg. He reports that everything has been good and he feels great. Denies any lightheadedness. Denies nocturia, urinary urgency, pushing, straining, gross hematuria, and burning. Foul odor from urine is gone. Pt is still sexually active. Erections are adequate.

## 2022-06-18 NOTE — ADDENDUM
[FreeTextEntry1] : This note was authored by Gladys Flores working as a scribe for Dr.Gary Khan. I, Dr. Fernando Khan have reviewed the content of this note and confirm it is true and accurate. I personally performed the history and physical examination and made all the decisions 06/02/2022.

## 2022-06-18 NOTE — ASSESSMENT
[FreeTextEntry1] : 5/2/2022:Telephone visit patient and wife. Patient asks that wife speak on  his behalf. MRI Prostate PIRADS 2. Nocturia every 1-2 hours. some dysuria. No pathogenic bacteria in urine, no pyuria on 4/10/2022 urine sample. I reviewid Test results. Will Rx sulfamethoxazole/trimethoprim 800/160  & start silodosin 8 mg once daily with food. risks, benefits, alternatives and expectations discussed in detail including nephrotoxicity of SxTx. patient to return to office in 3 weeks,\par \par 06/02/2022: Patient presents today for a follow up. Has been taking silodosin 8 mg once daily but ran out yesterday. Completed a course of Sulfamethoxazole Trimethoprim 800-160 mg. He reports that everything has been good and he feels great. Denies any lightheadedness. Denies nocturia, urinary urgency, pushing, straining, gross hematuria, and burning. Foul odor from urine is gone. Pt is still sexually active. Erections are adequate. \par \par The patient produced a light yellow urine sample which will be sent for urinalysis, urine cytology, and urine culture. \par \par Will continue Silodosin 8 mg once daily. Prescription was renewed. \par \par Patient will RTO in 6 months or sooner if clinically indicated. \par \par Preparation, in person office visit, and coordination of care: 22 minutes.

## 2022-06-18 NOTE — REVIEW OF SYSTEMS
[Negative] : Heme/Lymph [Dysuria] : no dysuria [Nocturia] : no nocturia [Burning Sensation] : no burning sensation during urination [Initiating Urination Req. Strain] : initiating urination does not require straining [Erectile Dysfunction] : no erectile dysfunction

## 2022-07-25 ENCOUNTER — APPOINTMENT (OUTPATIENT)
Dept: UROLOGY | Facility: CLINIC | Age: 77
End: 2022-07-25

## 2022-07-25 PROCEDURE — 99442: CPT

## 2022-07-25 NOTE — HISTORY OF PRESENT ILLNESS
[FreeTextEntry1] : 5/2/2022: Telephone visit patient and wife. Patient asks that wife speak on  his behalf. MRI Prostate PIRADS 2. Nocturia every 1-2 hours. some dysuria. No pathogenic bacteria in urine, no pyuria on 4/10/2022 urine sample.\par \par 06/02/2022: Patient presents today for a follow up. Has been taking silodosin 8 mg once daily but ran out yesterday. Completed a course of Sulfamethoxazole Trimethoprim 800-160 mg. He reports that everything has been good and he feels great. Denies any lightheadedness. Denies nocturia, urinary urgency, pushing, straining, gross hematuria, and burning. Foul odor from urine is gone. Pt is still sexually active. Erections are adequate. \par \par 07/25/2022:  was scheduled today for a telephone visit. Pt called today and spoke to my nurse. He told her that he wanted prescriptions for tamsulosin and finasteride, however there is no record of him being on these medications. I had wanted the patient to be on silodosin and prescribed that at the time of the last visit. A VM was left requesting the patient be rescheduled. I then reached the patient's wife who stated that there must have been some confusion. She informed me that there was supposed to be a form sent to my office requesting a cheaper option be considered. She did receive a 90 day supply of silodosin today by mail order pharmacy. Usually gets prescriptions from [facility].

## 2022-07-25 NOTE — ASSESSMENT
[FreeTextEntry1] : 5/2/2022:Telephone visit patient and wife. Patient asks that wife speak on  his behalf. MRI Prostate PIRADS 2. Nocturia every 1-2 hours. some dysuria. No pathogenic bacteria in urine, no pyuria on 4/10/2022 urine sample. I reviewid Test results. Will Rx sulfamethoxazole/trimethoprim 800/160  & start silodosin 8 mg once daily with food. risks, benefits, alternatives and expectations discussed in detail including nephrotoxicity of SxTx. patient to return to office in 3 weeks,\par \par 06/02/2022: Patient presents today for a follow up. Has been taking silodosin 8 mg once daily but ran out yesterday. Completed a course of Sulfamethoxazole Trimethoprim 800-160 mg. He reports that everything has been good and he feels great. Denies any lightheadedness. Denies nocturia, urinary urgency, pushing, straining, gross hematuria, and burning. Foul odor from urine is gone. Pt is still sexually active. Erections are adequate. \par \par The patient produced a light yellow urine sample which will be sent for urinalysis, urine cytology, and urine culture. \par Will continue Silodosin 8 mg once daily. Prescription was renewed. \par Patient will RTO in 6 months or sooner if clinically indicated. \par \par 07/25/2022:  was scheduled today for a telephone visit. Pt called today and spoke to my nurse. He told her that he wanted prescriptions for tamsulosin and finasteride, however there is no record of him being on these medications. I had wanted the patient to be on silodosin and prescribed that at the time of the last visit. A VM was left requesting the patient be rescheduled. I then reached the patient's wife who stated that there must have been some confusion. She informed me that there was supposed to be a form sent to my office requesting a cheaper option be considered. She did receive a 90 day supply of silodosin today by mail order pharmacy. [facility] gets prescriptions from [facility]. \par \par Patient will start taking silodosin 8 mg once daily with food. I advised the patient's wife to speak with [facility] to ensure it will be okay when it comes time for the refill.  \par \par Pt has a follow appointment scheduled for 12/12/2022. \par \par Duration of telephone visit: 11 minutes.

## 2022-12-12 ENCOUNTER — APPOINTMENT (OUTPATIENT)
Dept: UROLOGY | Facility: CLINIC | Age: 77
End: 2022-12-12

## 2023-01-02 ENCOUNTER — NON-APPOINTMENT (OUTPATIENT)
Age: 78
End: 2023-01-02

## 2023-01-03 ENCOUNTER — APPOINTMENT (OUTPATIENT)
Dept: UROLOGY | Facility: CLINIC | Age: 78
End: 2023-01-03
Payer: MEDICARE

## 2023-01-03 PROCEDURE — 99214 OFFICE O/P EST MOD 30 MIN: CPT | Mod: 95

## 2023-01-03 NOTE — ASSESSMENT
[FreeTextEntry1] : 5/2/2022:Telephone visit patient and wife. Patient asks that wife speak on  his behalf. MRI Prostate PIRADS 2. Nocturia every 1-2 hours. some dysuria. No pathogenic bacteria in urine, no pyuria on 4/10/2022 urine sample. I reviewid Test results. Will Rx sulfamethoxazole/trimethoprim 800/160  & start silodosin 8 mg once daily with food. risks, benefits, alternatives and expectations discussed in detail including nephrotoxicity of SxTx. patient to return to office in 3 weeks,\par \par 06/02/2022: Patient presents today for a follow up. Has been taking silodosin 8 mg once daily but ran out yesterday. Completed a course of Sulfamethoxazole Trimethoprim 800-160 mg. He reports that everything has been good and he feels great. Denies any lightheadedness. Denies nocturia, urinary urgency, pushing, straining, gross hematuria, and burning. Foul odor from urine is gone. Pt is still sexually active. Erections are adequate. \par \par The patient produced a light yellow urine sample which will be sent for urinalysis, urine cytology, and urine culture. \par Will continue Silodosin 8 mg once daily. Prescription was renewed. \par Patient will RTO in 6 months or sooner if clinically indicated. \par \par 07/25/2022:  was scheduled today for a telephone visit. Pt called today and spoke to my nurse. He told her that he wanted prescriptions for tamsulosin and finasteride, however there is no record of him being on these medications. I had wanted the patient to be on silodosin and prescribed that at the time of the last visit. A VM was left requesting the patient be rescheduled. I then reached the patient's wife who stated that there must have been some confusion. She informed me that there was supposed to be a form sent to my office requesting a cheaper option be considered. She did receive a 90 day supply of silodosin today by mail order pharmacy. [facility] prescriptions [facility]. \par \par Patient will start taking silodosin 8 mg once daily with food. I advised the patient's wife to speak with [facility] to ensure it will be okay when it comes time for the refill.  \par \par Pt has a follow appointment scheduled for 12/12/2022. \par \par Duration of telephone visit: 11 minutes. \par \par 01/03/2023: The patient gave permission for an audio and visual telehealth conference. We utilized [employer] teams telemetry doc platform. The patient was located in his home in [place], [place]. I was located in my office in [place], [place]. I spoke with the patient and his wife, who indicated that the silodosin will not be covered by their insurance going forward and the Insurance suggested tamsulosin as a possible replacement. I reviewed the two classes of medications given frequently to address the prostate: alpha-1 blockers and 5-alpha reductase inhibitors. I would like for him to proceed with tamsulosin after reviewing the risks and benefits. I also outlined the side effect profile. Specifically, orthostatic hypotension and retrograde ejaculation were discussed. \par \par I would like for them to follow up in two weeks to see how he is doing on the new medication. \par \par Preparation, audiovisual telehealth visit and coordination of care took 30 minutes.\par

## 2023-01-03 NOTE — HISTORY OF PRESENT ILLNESS
[Other Location: e.g. School (Enter Location, City,State)___] : at [unfilled], at the time of the visit. [FreeTextEntry1] : 5/2/2022: Telephone visit patient and wife. Patient asks that wife speak on  his behalf. MRI Prostate PIRADS 2. Nocturia every 1-2 hours. some dysuria. No pathogenic bacteria in urine, no pyuria on 4/10/2022 urine sample.\par \par 06/02/2022: Patient presents today for a follow up. Has been taking silodosin 8 mg once daily but ran out yesterday. Completed a course of Sulfamethoxazole Trimethoprim 800-160 mg. He reports that everything has been good and he feels great. Denies any lightheadedness. Denies nocturia, urinary urgency, pushing, straining, gross hematuria, and burning. Foul odor from urine is gone. Pt is still sexually active. Erections are adequate. \par \par 07/25/2022:  was scheduled today for a telephone visit. Pt called today and spoke to my nurse. He told her that he wanted prescriptions for tamsulosin and finasteride, however there is no record of him being on these medications. I had wanted the patient to be on silodosin and prescribed that at the time of the last visit. A VM was left requesting the patient be rescheduled. I then reached the patient's wife who stated that there must have been some confusion. She informed me that there was supposed to be a form sent to my office requesting a cheaper option be considered. She did receive a 90 day supply of silodosin today by mail order pharmacy. [facility]. \par \par 01/03/2023: The patient gave permission for an audio and visual telehealth conference. We utilized [employer] teams telemetry [employer] platform. The patient was located in his home in [place], [place]. I was located in my office in [place], [place]. I spoke with the patient and his wife, who indicated that the silodosin will not be covered by their insurance going forward and the Insurance suggested tamsulosin as a possible replacement. I reviewed the two classes of medications given frequently to address the prostate: alpha-1 blockers and 5-alpha reductase inhibitors. I would like for him to proceed with tamsulosin after reviewing the risks and benefits. I also outlined the side effect profile. Specifically, orthostatic hypotension and retrograde ejaculation were discussed. \par \par I would like for them to follow up in two weeks to see how he is doing on the new medication.

## 2023-01-03 NOTE — ADDENDUM
[FreeTextEntry1] : This note was authored by [Arsalan Brush] working as a scribe for Dr. Fernando Khan. I, Dr. Fernando Khan have reviewed the content of this note and confirm it is true and accurate. I personally performed the history and physical examination and made all the decisions. 01/03/2023

## 2023-02-16 ENCOUNTER — APPOINTMENT (OUTPATIENT)
Dept: OPHTHALMOLOGY | Facility: CLINIC | Age: 78
End: 2023-02-16
Payer: MEDICARE

## 2023-02-16 ENCOUNTER — NON-APPOINTMENT (OUTPATIENT)
Age: 78
End: 2023-02-16

## 2023-02-16 PROCEDURE — 92134 CPTRZ OPH DX IMG PST SGM RTA: CPT

## 2023-02-16 PROCEDURE — 92004 COMPRE OPH EXAM NEW PT 1/>: CPT

## 2023-08-25 ENCOUNTER — APPOINTMENT (OUTPATIENT)
Dept: UROLOGY | Facility: CLINIC | Age: 78
End: 2023-08-25
Payer: MEDICARE

## 2023-08-25 PROCEDURE — 99443: CPT | Mod: 95

## 2023-09-04 LAB
ALP BLD-CCNC: 61 U/L
ANION GAP SERPL CALC-SCNC: 11 MMOL/L
APPEARANCE: CLEAR
BACTERIA UR CULT: NORMAL
BACTERIA: NEGATIVE /HPF
BILIRUBIN URINE: NEGATIVE
BLOOD URINE: NEGATIVE
BUN SERPL-MCNC: 15 MG/DL
CALCIUM SERPL-MCNC: 9 MG/DL
CAST: 0 /LPF
CHLORIDE SERPL-SCNC: 106 MMOL/L
CO2 SERPL-SCNC: 24 MMOL/L
COLOR: YELLOW
CREAT SERPL-MCNC: 0.9 MG/DL
CYSTATIN C SERPL-MCNC: 0.88 MG/L
EGFR: 87 ML/MIN/1.73M2
EPITHELIAL CELLS: 0 /HPF
GFR/BSA.PRED SERPLBLD CYS-BASED-ARV: 86 ML/MIN/1.73M2
GLUCOSE QUALITATIVE U: NEGATIVE MG/DL
GLUCOSE SERPL-MCNC: 76 MG/DL
KETONES URINE: NEGATIVE MG/DL
LEUKOCYTE ESTERASE URINE: NEGATIVE
MICROSCOPIC-UA: NORMAL
NITRITE URINE: NEGATIVE
PH URINE: 6
POTASSIUM SERPL-SCNC: 4.9 MMOL/L
PROTEIN URINE: NEGATIVE MG/DL
PSA FREE FLD-MCNC: 27 %
PSA FREE SERPL-MCNC: 0.86 NG/ML
PSA SERPL-MCNC: 3.19 NG/ML
RED BLOOD CELLS URINE: 0 /HPF
SODIUM SERPL-SCNC: 141 MMOL/L
SPECIFIC GRAVITY URINE: 1.02
TESTOST FREE SERPL-MCNC: 5.4 PG/ML
TESTOST SERPL-MCNC: 382 NG/DL
URINE CYTOLOGY: NORMAL
UROBILINOGEN URINE: 0.2 MG/DL
WHITE BLOOD CELLS URINE: 0 /HPF

## 2023-09-08 ENCOUNTER — NON-APPOINTMENT (OUTPATIENT)
Age: 78
End: 2023-09-08

## 2023-09-08 NOTE — ADDENDUM
[FreeTextEntry1] : This note was authored by Heath Sanchez working as a scribe for Dr. Fernando Khan. I, Dr. Fernando Khan have reviewed the content of this note and confirm it is true and accurate. I personally performed the history and physical examination and made all the decisions. 08/25/2023

## 2023-09-08 NOTE — ASSESSMENT
[FreeTextEntry1] : 5/2/2022:Telephone visit patient and wife. Patient asks that wife speak on  his behalf. MRI Prostate PIRADS 2. Nocturia every 1-2 hours. some dysuria. No pathogenic bacteria in urine, no pyuria on 4/10/2022 urine sample. I reviewid Test results. Will Rx sulfamethoxazole/trimethoprim 800/160  & start silodosin 8 mg once daily with food. risks, benefits, alternatives and expectations discussed in detail including nephrotoxicity of SxTx. patient to return to office in 3 weeks,  06/02/2022: Patient presents today for a follow up. Has been taking silodosin 8 mg once daily but ran out yesterday. Completed a course of Sulfamethoxazole Trimethoprim 800-160 mg. He reports that everything has been good and he feels great. Denies any lightheadedness. Denies nocturia, urinary urgency, pushing, straining, gross hematuria, and burning. Foul odor from urine is gone. Pt is still sexually active. Erections are adequate.   The patient produced a light yellow urine sample which will be sent for urinalysis, urine cytology, and urine culture.  Will continue Silodosin 8 mg once daily. Prescription was renewed.  Patient will RTO in 6 months or sooner if clinically indicated.   07/25/2022:  was scheduled today for a telephone visit. Pt called today and spoke to my nurse. He told her that he wanted prescriptions for tamsulosin and finasteride, however there is no record of him being on these medications. I had wanted the patient to be on silodosin and prescribed that at the time of the last visit. A VM was left requesting the patient be rescheduled. I then reached the patient's wife who stated that there must have been some confusion. She informed me that there was supposed to be a form sent to my office requesting a cheaper option be considered. She did receive a 90 day supply of silodosin today by mail order pharmacy. [facility].  Patient will start taking silodosin 8 mg once daily with food. I advised the patient's wife to speak with [facility] to ensure it will be okay when it comes time for the refill.   Pt has a follow appointment scheduled for 12/12/2022.   01/03/2023: The patient gave permission for an audio and visual telehealth conference. We utilized [employer] teams telemetry [employer] platform. The patient was located in his home in [place], [place]. I was located in my office in [place], [place]. I spoke with the patient and his wife, who indicated that the silodosin will not be covered by their insurance going forward and the Insurance suggested tamsulosin as a possible replacement. I reviewed the two classes of medications given frequently to address the prostate: alpha-1 blockers and 5-alpha reductase inhibitors. I would like for him to proceed with tamsulosin after reviewing the risks and benefits. I also outlined the side effect profile. Specifically, orthostatic hypotension and retrograde ejaculation were discussed.  I would like for them to follow up in two weeks to see how he is doing on the new medication.  Preparation, audiovisual telehealth visit and coordination of care took 30 minutes.  08/25/2023: Mr. HERRERA is a 77 year old male presenting today via telehealth using real time 2 way audio-visual technology. We utilized [employer] teams telemetry doc platform. The patient, Mr. HERRERA, was located in his home at [address] [place] [address] at the time of the visit. The provider, REY WHELAN, was located in his office on [place], [place]. Verbal Consent was given by the patient on 08/25/2023 and my scribe served as witness. He was started on tamsulosin HCl 0.4 mg twice daily which he says provides significant improvement and he is very satisfied. His urinary stream is good and strong. He voids 3-5 times a day. he does not push nor strain to void. He now denies nocturia. He says he is now able to sleep through the night and expresses his satisfaction.   Plan: Pt continues Tamsulosin 0.4 mg twice daily.  Pt will provide blood sample for PSA screen. He will provide Urine sample for UA, urine Cytology, Urine Culture. Follow up telehealth in 2-3 weeks to review new lab works He will have in person visit in about 6 months.,   Preparation, audio-visual visit, and coordination of care took: 21 mins.

## 2023-09-08 NOTE — HISTORY OF PRESENT ILLNESS
[FreeTextEntry1] : 5/2/2022: Telephone visit patient and wife. Patient asks that wife speak on  his behalf. MRI Prostate PIRADS 2. Nocturia every 1-2 hours. some dysuria. No pathogenic bacteria in urine, no pyuria on 4/10/2022 urine sample.  06/02/2022: Patient presents today for a follow up. Has been taking silodosin 8 mg once daily but ran out yesterday. Completed a course of Sulfamethoxazole Trimethoprim 800-160 mg. He reports that everything has been good and he feels great. Denies any lightheadedness. Denies nocturia, urinary urgency, pushing, straining, gross hematuria, and burning. Foul odor from urine is gone. Pt is still sexually active. Erections are adequate.   07/25/2022:  was scheduled today for a telephone visit. Pt called today and spoke to my nurse. He told her that he wanted prescriptions for tamsulosin and finasteride, however there is no record of him being on these medications. I had wanted the patient to be on silodosin and prescribed that at the time of the last visit. A VM was left requesting the patient be rescheduled. I then reached the patient's wife who stated that there must have been some confusion. She informed me that there was supposed to be a form sent to my office requesting a cheaper option be considered. She did receive a 90 day supply of silodosin today by mail order pharmacy. [facility].   01/03/2023: The patient gave permission for an audio and visual telehealth conference. We utilized [employer] teams telemetry doc platform. The patient was located in his home in [place], [place]. I was located in my office in [place], [place]. I spoke with the patient and his wife, who indicated that the silodosin will not be covered by their insurance going forward and the Insurance suggested tamsulosin as a possible replacement. I reviewed the two classes of medications given frequently to address the prostate: alpha-1 blockers and 5-alpha reductase inhibitors. I would like for him to proceed with tamsulosin after reviewing the risks and benefits. I also outlined the side effect profile. Specifically, orthostatic hypotension and retrograde ejaculation were discussed.  I would like for them to follow up in two weeks to see how he is doing on the new medication.   08/25/2023: Mr. HERRERA is a 77 year old male presenting today via telehealth using real time 2 way audio-visual technology. We utilized [employer] teams telemetry [employer] platform. The patient, Mr. HERRERA, was located in his home at [address] [place] [address] at the time of the visit. The provider, REY WHELAN, was located in his office on [place], [place]. Verbal Consent was given by the patient on 08/25/2023 and my scribe served as witness. He was started on tamsulosin HCl 0.4 mg twice daily which he says provides significant improvement and he is very satisfied. His urinary stream is good and strong. He voids 3-5 times a day. he does not push nor strain to void. He now denies nocturia. He says he is now able to sleep through the night and expresses his satisfaction.

## 2023-10-19 ENCOUNTER — APPOINTMENT (OUTPATIENT)
Dept: OPHTHALMOLOGY | Facility: CLINIC | Age: 78
End: 2023-10-19

## 2024-05-19 ENCOUNTER — NON-APPOINTMENT (OUTPATIENT)
Age: 79
End: 2024-05-19

## 2024-05-20 ENCOUNTER — APPOINTMENT (OUTPATIENT)
Dept: UROLOGY | Facility: CLINIC | Age: 79
End: 2024-05-20
Payer: MEDICARE

## 2024-05-20 VITALS
OXYGEN SATURATION: 97 % | DIASTOLIC BLOOD PRESSURE: 68 MMHG | HEART RATE: 74 BPM | TEMPERATURE: 98.3 F | SYSTOLIC BLOOD PRESSURE: 174 MMHG

## 2024-05-20 PROCEDURE — 99214 OFFICE O/P EST MOD 30 MIN: CPT

## 2024-05-20 RX ORDER — SULFAMETHOXAZOLE AND TRIMETHOPRIM 800; 160 MG/1; MG/1
800-160 TABLET ORAL
Qty: 28 | Refills: 0 | Status: COMPLETED | COMMUNITY
Start: 2022-05-02 | End: 2024-05-20

## 2024-05-20 RX ORDER — SILODOSIN 8 MG/1
8 CAPSULE ORAL DAILY
Qty: 30 | Refills: 11 | Status: COMPLETED | COMMUNITY
Start: 2022-07-25 | End: 2024-05-20

## 2024-05-20 RX ORDER — SILODOSIN 8 MG/1
8 CAPSULE ORAL DAILY
Qty: 30 | Refills: 11 | Status: COMPLETED | COMMUNITY
Start: 2022-05-02 | End: 2024-05-20

## 2024-05-22 NOTE — ADDENDUM
[FreeTextEntry1] : This note was authored by Gladys Flores working as a scribe for Dr.Gary Khan. I, Dr. Fernando Khan have reviewed the content of this note and confirm it is true and accurate. I personally performed the history and physical examination and made all the decisions 05/20/2024

## 2024-05-22 NOTE — ASSESSMENT
[FreeTextEntry1] : 5/2/2022:Telephone visit patient and wife. Patient asks that wife speak on  his behalf. MRI Prostate PIRADS 2. Nocturia every 1-2 hours. some dysuria. No pathogenic bacteria in urine, no pyuria on 4/10/2022 urine sample. I reviewid Test results. Will Rx sulfamethoxazole/trimethoprim 800/160  & start silodosin 8 mg once daily with food. risks, benefits, alternatives and expectations discussed in detail including nephrotoxicity of SxTx. patient to return to office in 3 weeks,  06/02/2022: Patient presents today for a follow up. Has been taking silodosin 8 mg once daily but ran out yesterday. Completed a course of Sulfamethoxazole Trimethoprim 800-160 mg. He reports that everything has been good and he feels great. Denies any lightheadedness. Denies nocturia, urinary urgency, pushing, straining, gross hematuria, and burning. Foul odor from urine is gone. Pt is still sexually active. Erections are adequate.   The patient produced a light yellow urine sample which will be sent for urinalysis, urine cytology, and urine culture.  Will continue Silodosin 8 mg once daily. Prescription was renewed.  Patient will RTO in 6 months or sooner if clinically indicated.   07/25/2022:  was scheduled today for a telephone visit. Pt called today and spoke to my nurse. He told her that he wanted prescriptions for tamsulosin and finasteride, however there is no record of him being on these medications. I had wanted the patient to be on silodosin and prescribed that at the time of the last visit. A VM was left requesting the patient be rescheduled. I then reached the patient's wife who stated that there must have been some confusion. She informed me that there was supposed to be a form sent to my office requesting a cheaper option be considered. She did receive a 90 day supply of silodosin today by mail order pharmacy. [facility].  Patient will start taking silodosin 8 mg once daily with food. I advised the patient's wife to speak with [facility] to ensure it will be okay when it comes time for the refill.   Pt has a follow appointment scheduled for 12/12/2022.   01/03/2023: The patient gave permission for an audio and visual telehealth conference. We utilized [employer] teams telemetry [employer] platform. The patient was located in his home in [place], [place]. I was located in my office in [place], [place]. I spoke with the patient and his wife, who indicated that the silodosin will not be covered by their insurance going forward and the Insurance suggested tamsulosin as a possible replacement. I reviewed the two classes of medications given frequently to address the prostate: alpha-1 blockers and 5-alpha reductase inhibitors. I would like for him to proceed with tamsulosin after reviewing the risks and benefits. I also outlined the side effect profile. Specifically, orthostatic hypotension and retrograde ejaculation were discussed.  I would like for them to follow up in two weeks to see how he is doing on the new medication.  Preparation, audiovisual telehealth visit and coordination of care took 30 minutes.  08/25/2023: Mr. HERRERA is a 77 year old male presenting today via telehealth using real time 2 way audio-visual technology. We utilized [employer] telemetry doc platform. The patient, Mr. HERRERA, was located in his home at [address] [place] [address] at the time of the visit. The provider, REY WHELAN, was located in his office on [place], [place]. Verbal Consent was given by the patient on 08/25/2023 and my scribe served as witness. He was started on tamsulosin HCl 0.4 mg twice daily which he says provides significant improvement and he is very satisfied. His urinary stream is good and strong. He voids 3-5 times a day. he does not push nor strain to void. He now denies nocturia. He says he is now able to sleep through the night and expresses his satisfaction.   Plan: Pt continues Tamsulosin 0.4 mg twice daily.  Pt will provide blood sample for PSA screen. He will provide Urine sample for UA, urine Cytology, Urine Culture. Follow up telehealth in 2-3 weeks to review new lab works He will have in person visit in about 6 months.,   05/20/2024: Mr. KAYLEEN HERRERA presents today for a follow up. He is accompanied by his wife. He continues to take tamsulosin 0.4 mg BID. Denies urinary urgency, frequency, pushing, straining, burning, gross hematuria, and nocturia. Denies lightheadedness and nasal congestion. Patient and his wife remain sexually active without problems. His wife is happy with how things are. Denies PMHx of kidney stones. Prior smoker, quit 20 years ago. Smoked 1-2 packs a day for 35 -40 years.   The knee-chest position was utilized for the SHADY. Digital rectal exam found no suspicious rectal masses. Normal seminal vesicles. Anal tone is normal. The prostate is non tender, with normal texture, discrete borders, and no nodules. It is a 35 gram transurethral resection size. No rectal mucosal lesions. No gross blood on the examining finger.   The patient produced a urine sample which will be sent for urinalysis, urine cytology, and urine culture.   Blood work today included cystatin-C, dihydrotestosterone, PSA profile, renal panel, SHBG, and testosterone free and total.    Continue tamsulosin 0.4 mg BID. Prescription renewed.   Patient will have a telehealth visit next week to review and discuss lab results.    Preparation, in person office visit, and coordination of care took 30 minutes.

## 2024-05-22 NOTE — PHYSICAL EXAM
[Normal Appearance] : normal appearance [Well Groomed] : well groomed [General Appearance - In No Acute Distress] : no acute distress [Edema] : no peripheral edema [Respiration, Rhythm And Depth] : normal respiratory rhythm and effort [Exaggerated Use Of Accessory Muscles For Inspiration] : no accessory muscle use [Abdomen Soft] : soft [Abdomen Tenderness] : non-tender [Costovertebral Angle Tenderness] : no ~M costovertebral angle tenderness [Normal Station and Gait] : the gait and station were normal for the patient's age [] : no rash [No Focal Deficits] : no focal deficits [Oriented To Time, Place, And Person] : oriented to person, place, and time [Affect] : the affect was normal [Mood] : the mood was normal [FreeTextEntry1] : Wears eyeglasses  [de-identified] : The knee-chest position was utilized for the SHADY. Digital rectal exam found no suspicious rectal masses. Normal seminal vesicles. Anal tone is normal. The prostate is non tender, with normal texture, discrete borders, and no nodules. It is a 35 gram transurethral resection size. No rectal mucosal lesions. No gross blood on the examining finger.

## 2024-05-22 NOTE — HISTORY OF PRESENT ILLNESS
[FreeTextEntry1] : 5/2/2022: Telephone visit patient and wife. Patient asks that wife speak on  his behalf. MRI Prostate PIRADS 2. Nocturia every 1-2 hours. some dysuria. No pathogenic bacteria in urine, no pyuria on 4/10/2022 urine sample.  06/02/2022: Patient presents today for a follow up. Has been taking silodosin 8 mg once daily but ran out yesterday. Completed a course of Sulfamethoxazole Trimethoprim 800-160 mg. He reports that everything has been good and he feels great. Denies any lightheadedness. Denies nocturia, urinary urgency, pushing, straining, gross hematuria, and burning. Foul odor from urine is gone. Pt is still sexually active. Erections are adequate.   07/25/2022:  was scheduled today for a telephone visit. Pt called today and spoke to my nurse. He told her that he wanted prescriptions for tamsulosin and finasteride, however there is no record of him being on these medications. I had wanted the patient to be on silodosin and prescribed that at the time of the last visit. A VM was left requesting the patient be rescheduled. I then reached the patient's wife who stated that there must have been some confusion. She informed me that there was supposed to be a form sent to my office requesting a cheaper option be considered. She did receive a 90 day supply of silodosin today by mail order pharmacy. [facility].   01/03/2023: The patient gave permission for an audio and visual telehealth conference. We utilized [employer] teams telemetry doc platform. The patient was located in his home in [place], [place]. I was located in my office in [place], [place]. I spoke with the patient and his wife, who indicated that the silodosin will not be covered by their insurance going forward and the Insurance suggested tamsulosin as a possible replacement. I reviewed the two classes of medications given frequently to address the prostate: alpha-1 blockers and 5-alpha reductase inhibitors. I would like for him to proceed with tamsulosin after reviewing the risks and benefits. I also outlined the side effect profile. Specifically, orthostatic hypotension and retrograde ejaculation were discussed.  I would like for them to follow up in two weeks to see how he is doing on the new medication.   08/25/2023: Mr. HERRERA is a 77 year old male presenting today via telehealth using real time 2 way audio-visual technology. We utilized [employer] teams telemetry [employer] platform. The patient, Mr. HERRERA, was located in his home at [address] [place] [address] at the time of the visit. The provider, REY WHELAN, was located in his office on [place], [place]. Verbal Consent was given by the patient on 08/25/2023 and my scribe served as witness. He was started on tamsulosin HCl 0.4 mg twice daily which he says provides significant improvement and he is very satisfied. His urinary stream is good and strong. He voids 3-5 times a day. he does not push nor strain to void. He now denies nocturia. He says he is now able to sleep through the night and expresses his satisfaction.   05/20/2024: Mr. KAYLEEN HERRERA presents today for a follow up. He is accompanied by his wife. He continues to take tamsulosin 0.4 mg BID. Denies urinary urgency, frequency, pushing, straining, burning, gross hematuria, and nocturia. Denies lightheadedness and nasal congestion. Patient and his wife remain sexually active without problems. His wife is happy with how things are. Denies PMHx of kidney stones. Prior smoker, quit 20 years ago. Smoked 1-2 packs a day for 35 -40 years.

## 2024-05-23 LAB
ALBUMIN SERPL ELPH-MCNC: 4.4 G/DL
ANION GAP SERPL CALC-SCNC: 16 MMOL/L
APPEARANCE: CLEAR
BACTERIA UR CULT: NORMAL
BACTERIA: NEGATIVE /HPF
BILIRUBIN URINE: NEGATIVE
BLOOD URINE: NEGATIVE
BUN SERPL-MCNC: 16 MG/DL
CALCIUM SERPL-MCNC: 9.5 MG/DL
CAST: 0 /LPF
CHLORIDE SERPL-SCNC: 104 MMOL/L
CO2 SERPL-SCNC: 20 MMOL/L
COLOR: YELLOW
CREAT SERPL-MCNC: 1.04 MG/DL
CYSTATIN C SERPL-MCNC: 0.92 MG/L
EGFR: 73 ML/MIN/1.73M2
EPITHELIAL CELLS: 0 /HPF
GFR/BSA.PRED SERPLBLD CYS-BASED-ARV: 81 ML/MIN/1.73M2
GLUCOSE QUALITATIVE U: NEGATIVE MG/DL
GLUCOSE SERPL-MCNC: 105 MG/DL
KETONES URINE: NEGATIVE MG/DL
LEUKOCYTE ESTERASE URINE: NEGATIVE
MICROSCOPIC-UA: NORMAL
NITRITE URINE: NEGATIVE
PH URINE: 6.5
PHOSPHATE SERPL-MCNC: 3.4 MG/DL
POTASSIUM SERPL-SCNC: 4.6 MMOL/L
PROTEIN URINE: NEGATIVE MG/DL
PSA FREE FLD-MCNC: 28 %
PSA FREE SERPL-MCNC: 1 NG/ML
PSA SERPL-MCNC: 3.61 NG/ML
RED BLOOD CELLS URINE: 0 /HPF
SHBG SERPL-SCNC: 46.5 NMOL/L
SODIUM SERPL-SCNC: 140 MMOL/L
SPECIFIC GRAVITY URINE: 1.01
TESTOST FREE SERPL-MCNC: 4.1 PG/ML
TESTOST SERPL-MCNC: 372 NG/DL
URINE CYTOLOGY: NORMAL
UROBILINOGEN URINE: 0.2 MG/DL
WHITE BLOOD CELLS URINE: 0 /HPF

## 2024-05-29 ENCOUNTER — APPOINTMENT (OUTPATIENT)
Dept: UROLOGY | Facility: CLINIC | Age: 79
End: 2024-05-29
Payer: MEDICARE

## 2024-05-29 DIAGNOSIS — C34.92 MALIGNANT NEOPLASM OF UNSPECIFIED PART OF LEFT BRONCHUS OR LUNG: ICD-10-CM

## 2024-05-29 DIAGNOSIS — R97.20 ELEVATED PROSTATE, SPECIFIC ANTIGEN [PSA]: ICD-10-CM

## 2024-05-29 DIAGNOSIS — N40.1 BENIGN PROSTATIC HYPERPLASIA WITH LOWER URINARY TRACT SYMPMS: ICD-10-CM

## 2024-05-29 DIAGNOSIS — R39.15 URGENCY OF URINATION: ICD-10-CM

## 2024-05-29 DIAGNOSIS — C34.91 MALIGNANT NEOPLASM OF UNSPECIFIED PART OF RIGHT BRONCHUS OR LUNG: ICD-10-CM

## 2024-05-29 DIAGNOSIS — R35.1 NOCTURIA: ICD-10-CM

## 2024-05-29 DIAGNOSIS — N13.8 BENIGN PROSTATIC HYPERPLASIA WITH LOWER URINARY TRACT SYMPMS: ICD-10-CM

## 2024-05-29 LAB — ANDROSTANOLONE SERPL-MCNC: 36 NG/DL

## 2024-05-29 PROCEDURE — 99214 OFFICE O/P EST MOD 30 MIN: CPT

## 2024-05-29 RX ORDER — TAMSULOSIN HYDROCHLORIDE 0.4 MG/1
0.4 CAPSULE ORAL
Qty: 30 | Refills: 0 | Status: ACTIVE | COMMUNITY
Start: 2023-01-03 | End: 1900-01-01

## 2024-05-29 NOTE — PHYSICAL EXAM
[General Appearance - Well Developed] : well developed [General Appearance - Well Nourished] : well nourished [Normal Appearance] : normal appearance [Well Groomed] : well groomed [General Appearance - In No Acute Distress] : no acute distress [] : no respiratory distress [Respiration, Rhythm And Depth] : normal respiratory rhythm and effort [Skin Color & Pigmentation] : normal skin color and pigmentation [Oriented To Time, Place, And Person] : oriented to person, place, and time [Affect] : the affect was normal [Mood] : the mood was normal [Not Anxious] : not anxious

## 2024-06-02 PROBLEM — R39.15 URINARY URGENCY: Status: ACTIVE | Noted: 2022-04-10

## 2024-06-02 PROBLEM — R35.1 NOCTURIA: Status: ACTIVE | Noted: 2022-04-10

## 2024-06-02 PROBLEM — N40.1 BENIGN LOCALIZED HYPERPLASIA OF PROSTATE WITH URINARY OBSTRUCTION: Status: ACTIVE | Noted: 2022-04-19

## 2024-06-02 PROBLEM — R97.20 ELEVATED PSA: Status: ACTIVE | Noted: 2022-04-20

## 2024-06-02 PROBLEM — C34.92 ADENOCARCINOMA OF LEFT LUNG: Status: ACTIVE | Noted: 2018-11-28

## 2024-06-02 PROBLEM — C34.91 ADENOCARCINOMA OF RIGHT LUNG: Status: ACTIVE | Noted: 2018-11-28

## 2024-06-02 NOTE — HISTORY OF PRESENT ILLNESS
[FreeTextEntry1] : 5/2/2022: Telephone visit patient and wife. Patient asks that wife speak on  his behalf. MRI Prostate PIRADS 2. Nocturia every 1-2 hours. some dysuria. No pathogenic bacteria in urine, no pyuria on 4/10/2022 urine sample.  06/02/2022: Patient presents today for a follow up. Has been taking silodosin 8 mg once daily but ran out yesterday. Completed a course of Sulfamethoxazole Trimethoprim 800-160 mg. He reports that everything has been good and he feels great. Denies any lightheadedness. Denies nocturia, urinary urgency, pushing, straining, gross hematuria, and burning. Foul odor from urine is gone. Pt is still sexually active. Erections are adequate.   07/25/2022:  was scheduled today for a telephone visit. Pt called today and spoke to my nurse. He told her that he wanted prescriptions for tamsulosin and finasteride, however there is no record of him being on these medications. I had wanted the patient to be on silodosin and prescribed that at the time of the last visit. A VM was left requesting the patient be rescheduled. I then reached the patient's wife who stated that there must have been some confusion. She informed me that there was supposed to be a form sent to my office requesting a cheaper option be considered. She did receive a 90 day supply of silodosin today by mail order pharmacy. [facility].   01/03/2023: The patient gave permission for an audio and visual telehealth conference. We utilized [employer] teams telemetry doc platform. The patient was located in his home in [place], [place]. I was located in my office in [place], [place]. I spoke with the patient and his wife, who indicated that the silodosin will not be covered by their insurance going forward and the Insurance suggested tamsulosin as a possible replacement. I reviewed the two classes of medications given frequently to address the prostate: alpha-1 blockers and 5-alpha reductase inhibitors. I would like for him to proceed with tamsulosin after reviewing the risks and benefits. I also outlined the side effect profile. Specifically, orthostatic hypotension and retrograde ejaculation were discussed.  I would like for them to follow up in two weeks to see how he is doing on the new medication.   08/25/2023: Mr. HERRERA is a 77 year old male presenting today via telehealth using real time 2 way audio-visual technology. We utilized [employer] teams telemetry doc platform. The patient, Mr. HERRERA, was located in his home at [address] [place] [address] at the time of the visit. The provider, REY WHELAN, was located in his office on [place], [place]. Verbal Consent was given by the patient on 08/25/2023 and my scribe served as witness. He was started on tamsulosin HCl 0.4 mg twice daily which he says provides significant improvement and he is very satisfied. His urinary stream is good and strong. He voids 3-5 times a day. he does not push nor strain to void. He now denies nocturia. He says he is now able to sleep through the night and expresses his satisfaction.   05/20/2024: Mr. KAYLEEN HERRERA presents today for a follow up. He is accompanied by his wife. He continues to take tamsulosin 0.4 mg BID. Denies urinary urgency, frequency, pushing, straining, burning, gross hematuria, and nocturia. Denies lightheadedness and nasal congestion. Patient and his wife remain sexually active without problems. His wife is happy with how things are. Denies PMHx of kidney stones. Prior smoker, quit 20 years ago. Smoked 1-2 packs a day for 35 -40 years.   05/29/2024: Mr. KAYLEEN HERRERA presents today for an audio visual telehealth visit for which he gave permission for. The patient was located at home at [address] [place] [address] and I was located at my office in [place], [place]. He is accompanied by his wife. Patient had lab work done on 5/18/2024. Urinalysis was normal . Urine culture showed no significant growth. Urine cytology negative for high grade urothelial carcinoma. PSA was 3.61. Creatinine was 1.04. Free testosterone is a bit low at 4.1. Total testosterone is normal. He continues to take tamsulosin 0.4 mg BID.

## 2024-06-02 NOTE — ASSESSMENT
[FreeTextEntry1] : 5/2/2022:Telephone visit patient and wife. Patient asks that wife speak on  his behalf. MRI Prostate PIRADS 2. Nocturia every 1-2 hours. some dysuria. No pathogenic bacteria in urine, no pyuria on 4/10/2022 urine sample. I reviewid Test results. Will Rx sulfamethoxazole/trimethoprim 800/160  & start silodosin 8 mg once daily with food. risks, benefits, alternatives and expectations discussed in detail including nephrotoxicity of SxTx. patient to return to office in 3 weeks,  06/02/2022: Patient presents today for a follow up. Has been taking silodosin 8 mg once daily but ran out yesterday. Completed a course of Sulfamethoxazole Trimethoprim 800-160 mg. He reports that everything has been good and he feels great. Denies any lightheadedness. Denies nocturia, urinary urgency, pushing, straining, gross hematuria, and burning. Foul odor from urine is gone. Pt is still sexually active. Erections are adequate.   The patient produced a light yellow urine sample which will be sent for urinalysis, urine cytology, and urine culture.  Will continue Silodosin 8 mg once daily. Prescription was renewed.  Patient will RTO in 6 months or sooner if clinically indicated.   07/25/2022:  was scheduled today for a telephone visit. Pt called today and spoke to my nurse. He told her that he wanted prescriptions for tamsulosin and finasteride, however there is no record of him being on these medications. I had wanted the patient to be on silodosin and prescribed that at the time of the last visit. A VM was left requesting the patient be rescheduled. I then reached the patient's wife who stated that there must have been some confusion. She informed me that there was supposed to be a form sent to my office requesting a cheaper option be considered. She did receive a 90 day supply of silodosin today by mail order pharmacy. [facility].  Patient will start taking silodosin 8 mg once daily with food. I advised the patient's wife to speak with [facility] to ensure it will be okay when it comes time for the refill.   Pt has a follow appointment scheduled for 12/12/2022.   01/03/2023: The patient gave permission for an audio and visual telehealth conference. We utilized [employer] teams telemetry [employer] platform. The patient was located in his home in [place], [place]. I was located in my office in [place], [place]. I spoke with the patient and his wife, who indicated that the silodosin will not be covered by their insurance going forward and the Insurance suggested tamsulosin as a possible replacement. I reviewed the two classes of medications given frequently to address the prostate: alpha-1 blockers and 5-alpha reductase inhibitors. I would like for him to proceed with tamsulosin after reviewing the risks and benefits. I also outlined the side effect profile. Specifically, orthostatic hypotension and retrograde ejaculation were discussed.  I would like for them to follow up in two weeks to see how he is doing on the new medication.  Preparation, audiovisual telehealth visit and coordination of care took 30 minutes.  08/25/2023: Mr. HERRERA is a 77 year old male presenting today via telehealth using real time 2 way audio-visual technology. We utilized [employer] teams telemetry doc platform. The patient, Mr. HERRERA, was located in his home at [address] [place] [address] at the time of the visit. The provider, REY WHELAN, was located in his office on [place], [place]. Verbal Consent was given by the patient on 08/25/2023 and my scribe served as witness. He was started on tamsulosin HCl 0.4 mg twice daily which he says provides significant improvement and he is very satisfied. His urinary stream is good and strong. He voids 3-5 times a day. he does not push nor strain to void. He now denies nocturia. He says he is now able to sleep through the night and expresses his satisfaction.   Plan: Pt continues Tamsulosin 0.4 mg twice daily.  Pt will provide blood sample for PSA screen. He will provide Urine sample for UA, urine Cytology, Urine Culture. Follow up telehealth in 2-3 weeks to review new lab works He will have in person visit in about 6 months.,   05/20/2024: Mr. KAYLEEN HERRERA presents today for a follow up. He is accompanied by his wife. He continues to take tamsulosin 0.4 mg BID. Denies urinary urgency, frequency, pushing, straining, burning, gross hematuria, and nocturia. Denies lightheadedness and nasal congestion. Patient and his wife remain sexually active without problems. His wife is happy with how things are. Denies PMHx of kidney stones. Prior smoker, quit 20 years ago. Smoked 1-2 packs a day for 35 -40 years.   The knee-chest position was utilized for the SHADY. Digital rectal exam found no suspicious rectal masses. Normal seminal vesicles. Anal tone is normal. The prostate is non tender, with normal texture, discrete borders, and no nodules. It is a 35 gram transurethral resection size. No rectal mucosal lesions. No gross blood on the examining finger.  The patient produced a urine sample which will be sent for urinalysis, urine cytology, and urine culture.  Blood work today included cystatin-C, dihydrotestosterone, PSA profile, renal panel, SHBG, and testosterone free and total.  Continue tamsulosin 0.4 mg BID. Prescription renewed.  Patient will have a telehealth visit next week to review and discuss lab results.    05/29/2024: Mr. KAYLEEN HERRERA presents today for an audio visual telehealth visit for which he gave permission for. The patient was located at home at [address] [place] [address] and I was located at my office in [place], [place]. He is accompanied by his wife. Patient had lab work done on 5/18/2024. Urinalysis was normal . Urine culture showed no significant growth. Urine cytology negative for high grade urothelial carcinoma. PSA was 3.61. Creatinine was 1.04. Free testosterone is a bit low at 4.1. Total testosterone is normal. He continues to take tamsulosin 0.4 mg BID.   Reviewed and discussed lab work of 5/18/2024 in detail with the pt and his wife.    Renewed tamsulosin 0.4 mg BID.    Patient will RTO in 1 year, sooner if clinically indicated.    Preparation, audio-visual visit, and coordination of care took 30 minutes.

## 2024-06-02 NOTE — ADDENDUM
[FreeTextEntry1] : This note was authored by Gladys Flores working as a scribe for Dr.Gary Khan. I, Dr. Fernando Khan have reviewed the content of this note and confirm it is true and accurate. I personally performed the history and physical examination and made all the decisions 05/29/2024

## 2024-09-12 NOTE — ADDENDUM
[FreeTextEntry1] : This note was authored by Gladys Flores working as a scribe for Dr.Gary Khan. I, Dr. Fernando Khan have reviewed the content of this note and confirm it is true and accurate. I personally performed the history and physical examination and made all the decisions 07/25/2022.  75

## 2024-09-23 RX ORDER — TAMSULOSIN HYDROCHLORIDE 0.4 MG/1
0.4 CAPSULE ORAL
Qty: 180 | Refills: 3 | Status: ACTIVE | COMMUNITY
Start: 2024-09-23 | End: 1900-01-01

## 2025-09-10 ENCOUNTER — APPOINTMENT (OUTPATIENT)
Dept: UROLOGY | Facility: CLINIC | Age: 80
End: 2025-09-10

## 2025-09-18 DIAGNOSIS — Z00.00 ENCOUNTER FOR GENERAL ADULT MEDICAL EXAMINATION W/OUT ABNORMAL FINDINGS: ICD-10-CM

## 2025-09-18 LAB
ALP BLD-CCNC: 66 U/L
ANION GAP SERPL CALC-SCNC: 13 MMOL/L
BUN SERPL-MCNC: 17 MG/DL
CALCIUM SERPL-MCNC: 8.7 MG/DL
CHLORIDE SERPL-SCNC: 107 MMOL/L
CO2 SERPL-SCNC: 22 MMOL/L
CREAT SERPL-MCNC: 1.03 MG/DL
CYSTATIN C SERPL-MCNC: 0.93 MG/L
EGFRCR SERPLBLD CKD-EPI 2021: 73 ML/MIN/1.73M2
GFR/BSA.PRED SERPLBLD CYS-BASED-ARV: 79 ML/MIN/1.73M2
GLUCOSE SERPL-MCNC: 98 MG/DL
POTASSIUM SERPL-SCNC: 4.6 MMOL/L
PSA FREE FLD-MCNC: 28 %
PSA FREE SERPL-MCNC: 1.38 NG/ML
PSA SERPL-MCNC: 4.96 NG/ML
SODIUM SERPL-SCNC: 141 MMOL/L

## 2025-09-19 LAB
APPEARANCE: CLEAR
BACTERIA: NEGATIVE /HPF
BILIRUBIN URINE: NEGATIVE
BLOOD URINE: NEGATIVE
CAST: 0 /LPF
COLOR: YELLOW
EPITHELIAL CELLS: 0 /HPF
GLUCOSE QUALITATIVE U: NEGATIVE MG/DL
KETONES URINE: NEGATIVE MG/DL
LEUKOCYTE ESTERASE URINE: NEGATIVE
MICROSCOPIC-UA: NORMAL
NITRITE URINE: NEGATIVE
PH URINE: 7
PROTEIN URINE: NEGATIVE MG/DL
RED BLOOD CELLS URINE: 1 /HPF
SPECIFIC GRAVITY URINE: 1.01
URINE CYTOLOGY: NORMAL
UROBILINOGEN URINE: 0.2 MG/DL
WHITE BLOOD CELLS URINE: 0 /HPF

## 2025-09-21 LAB
TESTOST FREE SERPL-MCNC: 3.9 PG/ML
TESTOST SERPL-MCNC: 496 NG/DL

## 2025-09-22 LAB — BACTERIA UR CULT: NORMAL

## 2025-09-27 LAB — ANDROSTANOLONE SERPL-MCNC: 35 NG/DL
